# Patient Record
Sex: FEMALE | Race: BLACK OR AFRICAN AMERICAN | NOT HISPANIC OR LATINO | Employment: FULL TIME | URBAN - METROPOLITAN AREA
[De-identification: names, ages, dates, MRNs, and addresses within clinical notes are randomized per-mention and may not be internally consistent; named-entity substitution may affect disease eponyms.]

---

## 2020-11-16 ENCOUNTER — CLINICAL SUPPORT (OUTPATIENT)
Dept: URGENT CARE | Facility: CLINIC | Age: 65
End: 2020-11-16

## 2020-11-16 DIAGNOSIS — Z13.9 ENCOUNTER FOR SCREENING: Primary | ICD-10-CM

## 2020-11-16 LAB
CTP QC/QA: YES
SARS-COV-2 RDRP RESP QL NAA+PROBE: NEGATIVE

## 2020-11-16 PROCEDURE — U0002: ICD-10-PCS | Mod: QW,S$GLB,, | Performed by: PHYSICIAN ASSISTANT

## 2020-11-16 PROCEDURE — U0002 COVID-19 LAB TEST NON-CDC: HCPCS | Mod: QW,S$GLB,, | Performed by: PHYSICIAN ASSISTANT

## 2021-07-15 ENCOUNTER — OFFICE VISIT (OUTPATIENT)
Dept: URGENT CARE | Facility: CLINIC | Age: 66
End: 2021-07-15

## 2021-07-15 VITALS
BODY MASS INDEX: 35.36 KG/M2 | HEIGHT: 66 IN | HEART RATE: 81 BPM | DIASTOLIC BLOOD PRESSURE: 56 MMHG | RESPIRATION RATE: 18 BRPM | TEMPERATURE: 98 F | OXYGEN SATURATION: 99 % | WEIGHT: 220 LBS | SYSTOLIC BLOOD PRESSURE: 142 MMHG

## 2021-07-15 DIAGNOSIS — Z11.9 ENCOUNTER FOR SCREENING EXAMINATION FOR INFECTIOUS DISEASE: Primary | ICD-10-CM

## 2021-07-15 LAB
CTP QC/QA: YES
SARS-COV-2 RDRP RESP QL NAA+PROBE: NEGATIVE

## 2021-07-15 PROCEDURE — 3008F BODY MASS INDEX DOCD: CPT | Mod: CPTII,S$GLB,, | Performed by: PHYSICIAN ASSISTANT

## 2021-07-15 PROCEDURE — 99203 PR OFFICE/OUTPT VISIT, NEW, LEVL III, 30-44 MIN: ICD-10-PCS | Mod: S$GLB,,, | Performed by: PHYSICIAN ASSISTANT

## 2021-07-15 PROCEDURE — 3008F PR BODY MASS INDEX (BMI) DOCUMENTED: ICD-10-PCS | Mod: CPTII,S$GLB,, | Performed by: PHYSICIAN ASSISTANT

## 2021-07-15 PROCEDURE — U0002 COVID-19 LAB TEST NON-CDC: HCPCS | Mod: QW,S$GLB,, | Performed by: PHYSICIAN ASSISTANT

## 2021-07-15 PROCEDURE — U0002: ICD-10-PCS | Mod: QW,S$GLB,, | Performed by: PHYSICIAN ASSISTANT

## 2021-07-15 PROCEDURE — 99203 OFFICE O/P NEW LOW 30 MIN: CPT | Mod: S$GLB,,, | Performed by: PHYSICIAN ASSISTANT

## 2021-07-15 RX ORDER — LISINOPRIL AND HYDROCHLOROTHIAZIDE 20; 25 MG/1; MG/1
1 TABLET ORAL DAILY
COMMUNITY
Start: 2021-06-04

## 2021-07-15 RX ORDER — NAPROXEN SODIUM 220 MG/1
1 TABLET, FILM COATED ORAL DAILY
COMMUNITY
Start: 2021-06-04

## 2021-07-15 RX ORDER — LEVETIRACETAM 500 MG/1
1 TABLET ORAL 2 TIMES DAILY
COMMUNITY
Start: 2021-06-04

## 2021-07-15 RX ORDER — ATORVASTATIN CALCIUM 40 MG/1
1 TABLET, FILM COATED ORAL DAILY
COMMUNITY
Start: 2021-06-04

## 2022-09-02 ENCOUNTER — HOSPITAL ENCOUNTER (EMERGENCY)
Facility: HOSPITAL | Age: 67
Discharge: HOME | End: 2022-09-02
Attending: EMERGENCY MEDICINE
Payer: MEDICARE

## 2022-09-02 VITALS
HEART RATE: 52 BPM | DIASTOLIC BLOOD PRESSURE: 74 MMHG | SYSTOLIC BLOOD PRESSURE: 179 MMHG | OXYGEN SATURATION: 97 % | WEIGHT: 213 LBS | BODY MASS INDEX: 35.49 KG/M2 | TEMPERATURE: 98 F | HEIGHT: 65 IN | RESPIRATION RATE: 20 BRPM

## 2022-09-02 DIAGNOSIS — U07.1 COVID-19: Primary | ICD-10-CM

## 2022-09-02 DIAGNOSIS — U07.1 COVID-19 VIRUS DETECTED: ICD-10-CM

## 2022-09-02 LAB — SARS-COV-2 RDRP RESP QL NAA+PROBE: POSITIVE

## 2022-09-02 PROCEDURE — 99282 PR EMERGENCY DEPT VISIT,LEVEL II: ICD-10-PCS | Mod: CR,,, | Performed by: PHYSICIAN ASSISTANT

## 2022-09-02 PROCEDURE — U0002 COVID-19 LAB TEST NON-CDC: HCPCS | Performed by: EMERGENCY MEDICINE

## 2022-09-02 PROCEDURE — 99282 EMERGENCY DEPT VISIT SF MDM: CPT | Mod: CR,,, | Performed by: PHYSICIAN ASSISTANT

## 2022-09-02 PROCEDURE — 99283 EMERGENCY DEPT VISIT LOW MDM: CPT

## 2022-09-02 NOTE — FIRST PROVIDER EVALUATION
"Medical screening exam completed.  I have conducted a focused provider triage encounter, findings are as follows:    Brief history of present illness:  67 yo F w/ covid exposure c/o cough. No fever, sob, or chest pain    Vitals:    09/02/22 1621   BP: (!) 152/81   BP Location: Right arm   Patient Position: Sitting   Pulse: 70   Resp: 20   Temp: 98 °F (36.7 °C)   TempSrc: Oral   SpO2: 99%   Weight: 96.6 kg (213 lb)   Height: 5' 5" (1.651 m)       Pertinent physical exam:  no respiratory distress, well appearing    Brief workup plan:  rapid covid.    Preliminary workup initiated; this workup will be continued and followed by the physician or advanced practice provider that is assigned to the patient when roomed.  "

## 2022-09-03 NOTE — ED PROVIDER NOTES
Encounter Date: 9/2/2022       History     Chief Complaint   Patient presents with    COVID-19 Concerns     Wants a covid test, grandson tested +, reports cough, took 3 home tests, 2 were negative, 1 was positive     The patient is a 66 year old female, who presents to the ER requesting Covid testing. She has been vaccinated. She states that she tested positive at home on 8/24. She states that she had mild URI symptoms for 1-2 days, then seemed to improve. She states that she took another test on 8/29 due to being exposed to her grandson who was Covid positive and her result was negative. She states that she tested again today because she has a minor cough and wants to find out if she is at risk of spreading Covid. She states that she feels well overall and thinks that her test may be unreliable. She denies any additional symptoms or concerns. No pre-arrival treatment.     Review of patient's allergies indicates:  No Known Allergies  Past Medical History:   Diagnosis Date    Hyperlipidemia     Hypertension     Seizures      No past surgical history on file.  No family history on file.  Social History     Tobacco Use    Smoking status: Never    Smokeless tobacco: Never   Substance Use Topics    Alcohol use: No    Drug use: No     Review of Systems   Constitutional:  Negative for activity change, appetite change, chills, fatigue and fever.   HENT:  Negative for congestion, rhinorrhea and sore throat.    Eyes:  Negative for visual disturbance.   Respiratory:  Positive for cough. Negative for chest tightness, shortness of breath and wheezing.    Cardiovascular:  Negative for chest pain, palpitations and leg swelling.   Gastrointestinal:  Negative for abdominal pain, diarrhea, nausea and vomiting.   Genitourinary:  Negative for decreased urine volume, difficulty urinating, dysuria and flank pain.   Musculoskeletal:  Negative for back pain, gait problem, myalgias, neck pain and neck stiffness.   Skin:  Negative for rash.    Allergic/Immunologic: Negative for immunocompromised state.   Neurological:  Negative for dizziness, syncope, light-headedness, numbness and headaches.   Psychiatric/Behavioral:  Negative for confusion.      Physical Exam     Initial Vitals [09/02/22 1621]   BP Pulse Resp Temp SpO2   (!) 152/81 70 20 98 °F (36.7 °C) 99 %      MAP       --         Physical Exam    Nursing note and vitals reviewed.  Constitutional: She appears well-developed and well-nourished. She is not diaphoretic. No distress.   Alert and ambulatory. Well appearing. No obvious distress.    HENT:   Head: Normocephalic.   Mouth/Throat: Oropharynx is clear and moist.   Eyes: Conjunctivae are normal.   Cardiovascular:  Normal rate.           Pulmonary/Chest: No respiratory distress. She has no wheezes.   Not coughing during interview/exam. No conversational dyspnea.    Abdominal: She exhibits no distension. There is no abdominal tenderness.   Musculoskeletal:         General: No tenderness or edema. Normal range of motion.     Neurological: She is alert and oriented to person, place, and time. She has normal strength. No sensory deficit.   Skin: Skin is warm and dry. No rash noted.   Psychiatric: She has a normal mood and affect. Her behavior is normal.       ED Course   Procedures  Labs Reviewed   SARS-COV-2 RNA AMPLIFICATION, QUAL - Abnormal; Notable for the following components:       Result Value    SARS-CoV-2 RNA, Amplification, Qual Positive (*)     All other components within normal limits   HIV 1 / 2 ANTIBODY   HEPATITIS C ANTIBODY          Imaging Results    None          Medications - No data to display  Medical Decision Making:   Initial Assessment:   65 yo female, here requesting Covid test due to cough and recent exposure. Positive home test earlier.   Differential Diagnosis:   Covid, Bronchitis, URI, etc   Clinical Tests:   Lab Tests: Ordered and Reviewed  ED Management:  Vital signs reviewed - benign   Records reviewed   I discussed  the case with the ER attending physician   Covid is positive   Covid precautions provided   Pt advised to follow up closely with her PCP   Pt advised to return to the ER promptly if worse in any way                     Clinical Impression:   Final diagnoses:  [U07.1] COVID-19 (Primary)        ED Disposition Condition    Discharge Stable          ED Prescriptions    None       Follow-up Information       Follow up With Specialties Details Why Contact Info    [facility] Emergency Medicine  If symptoms worsen in any way. Rest, hydrate, and quarantine. Take Tylenol as directed. Follow up closely with primary care. [address]  [facility] [address]  [phone]             Markos Vargas PA-C  09/02/22 1910

## 2022-11-09 ENCOUNTER — HOSPITAL ENCOUNTER (EMERGENCY)
Facility: HOSPITAL | Age: 67
Discharge: HOME | End: 2022-11-09
Attending: EMERGENCY MEDICINE
Payer: MEDICARE

## 2022-11-09 VITALS
HEIGHT: 65 IN | TEMPERATURE: 98 F | RESPIRATION RATE: 18 BRPM | HEART RATE: 60 BPM | BODY MASS INDEX: 35.49 KG/M2 | SYSTOLIC BLOOD PRESSURE: 158 MMHG | DIASTOLIC BLOOD PRESSURE: 73 MMHG | WEIGHT: 213 LBS | OXYGEN SATURATION: 97 %

## 2022-11-09 DIAGNOSIS — J06.9 VIRAL URI WITH COUGH: Primary | ICD-10-CM

## 2022-11-09 LAB
INFLUENZA A, MOLECULAR: NOT DETECTED
INFLUENZA B, MOLECULAR: NOT DETECTED
RSV AG BY MOLECULAR METHOD: NOT DETECTED
SARS-COV-2 RNA RESP QL NAA+PROBE: NOT DETECTED

## 2022-11-09 PROCEDURE — 0241U SARS-COV2 (COVID) WITH FLU/RSV BY PCR: CPT | Performed by: NURSE PRACTITIONER

## 2022-11-09 PROCEDURE — 99284 EMERGENCY DEPT VISIT MOD MDM: CPT

## 2022-11-09 PROCEDURE — 99284 EMERGENCY DEPT VISIT MOD MDM: CPT | Mod: CS,,, | Performed by: NURSE PRACTITIONER

## 2022-11-09 PROCEDURE — 99284 PR EMERGENCY DEPT VISIT,LEVEL IV: ICD-10-PCS | Mod: CS,,, | Performed by: NURSE PRACTITIONER

## 2022-11-09 RX ORDER — FLUTICASONE PROPIONATE 50 MCG
1 SPRAY, SUSPENSION (ML) NASAL 2 TIMES DAILY PRN
Qty: 15 G | Refills: 0 | Status: SHIPPED | OUTPATIENT
Start: 2022-11-09

## 2022-11-09 RX ORDER — MINERAL OIL
180 ENEMA (ML) RECTAL NIGHTLY PRN
Qty: 10 TABLET | Refills: 0 | Status: SHIPPED | OUTPATIENT
Start: 2022-11-09 | End: 2022-11-19

## 2022-11-09 NOTE — ED NOTES
Patient identifiers verified and correct for Ms Tomas  C/C Sinus issues, SEE NN  APPEARANCE: awake and alert in NAD.  SKIN: warm, dry and intact. No breakdown or bruising.  MUSCULOSKELETAL: Patient moving all extremities spontaneously, no obvious swelling or deformities noted. Ambulates independently.  RESPIRATORY: Denies shortness of breath.Respirations unlabored. Positive cough, worse at night, denies fevers  CARDIAC: Denies CP, 2+ distal pulses; no peripheral edema  ABDOMEN: S/ND/NT, Denies nausea  : voids spontaneously, denies difficulty  Neurologic: AAO x 4; follows commands equal strength in all extremities; denies numbness/tingling. Denies dizziness  Denies weakness, reports frequently has to blo her nose,

## 2022-11-09 NOTE — ED PROVIDER NOTES
Chief complaint:  Sinusitis (Otc meds not working, having to sleep with mouth open, cough)      HPI:  Kimberly Tomas is a 66 y.o. female with past medical history significant for hypertension, hyperlipidemia and seizure disorder who presents to the emergency department today for evaluation of URI symptoms.  Her symptoms began on Tuesday.  She reports nasal symptoms including a runny nose during the day, a stopped up nose at night, a headache and a cough.  She states the headache is mild and located across her forehead.  It is gradual in onset and intermittent.  She denies any neck stiffness.  She has a cough that is occasionally productive of white sputum.  She denies hemoptysis.  She denies chest pain or shortness of breath.  She denies any fever or chills.  Her grandson is sick with similar symptoms.  She has not taken anything over-the-counter.  She states she came to the emergency department today because it is difficult to sleep at night because she can not breathe out of her nose.    Review of patient's allergies indicates:  No Known Allergies    No current facility-administered medications on file prior to encounter.     Current Outpatient Medications on File Prior to Encounter   Medication Sig Dispense Refill    atorvastatin (LIPITOR) 40 MG tablet Take 1 tablet by mouth once daily.      levETIRAcetam (KEPPRA) 500 MG Tab Take 1 tablet by mouth 2 (two) times daily.      lisinopriL-hydrochlorothiazide (PRINZIDE,ZESTORETIC) 20-25 mg Tab Take 1 tablet by mouth once daily.      aspirin 81 MG Chew Take 1 tablet by mouth once daily.      ibuprofen (ADVIL,MOTRIN) 600 MG tablet Take 1 tablet (600 mg total) by mouth every 6 (six) hours as needed for Pain. 20 tablet 0       PMH:  As per HPI and below:  Past Medical History:   Diagnosis Date    Hyperlipidemia     Hypertension     Seizures      History reviewed. No pertinent surgical history.    Social History     Socioeconomic History    Marital status: Single   Tobacco  Use    Smoking status: Never    Smokeless tobacco: Never   Substance and Sexual Activity    Alcohol use: No    Drug use: No       History reviewed. No pertinent family history.    Review of Systems  As per HPI and Below  Constitutional: Negative for chills and fever.   HENT:  Positive for nasal congestion.  Negative for sore throat.  Negative for ear pain.  Negative for facial pain.  Eyes: Negative for visual disturbance.  Negative for eye pain.  Negative for eye redness.  Respiratory: Negative shortness of breath.  Positive for cough.  Positive for sputum.  Negative for hemoptysis.  Cardiovascular: Negative for chest pain.   Gastrointestinal: Negative for abdominal pain, diarrhea, nausea and vomiting.   Genitourinary: Negative for flank pain. Negative for dysuria.  Musculoskeletal: Negative for arthralgias and myalgias.  Negative for neck pain.  Negative for neck stiffness.  Skin: Negative for rash and wound.   Neurological: Negative for dizziness. Negative for weakness and numbness.  Positive for headache.  Psychiatric/Behavioral: Negative for confusion.       Physical Exam:    Vitals:    11/09/22 1825   BP: (!) 158/73   Pulse: 60   Resp: 18   Temp: 98.2 °F (36.8 °C)     Nursing note and vitals reviewed.  Constitutional: Patient appears well-developed and well-nourished. Not diaphoretic. No distress.   HENT:   Head: Normocephalic and atraumatic.  Normal-appearing posterior oropharynx, no erythema, exudates or petechiae.  Uvula is midline.  Normal voice.  No trismus.  Normal TMs bilaterally.  No sinus tenderness.  Boggy turbinates bilaterally.  Eyes: Conjunctivae are normal. No scleral icterus.   Neck: Normal range of motion. Neck supple.   Cardiovascular: Normal rate, regular rhythm and normal heart sounds.   Pulmonary/Chest:  Lungs are clear to auscultation bilaterally.  No respiratory distress.  Abdominal: Soft. There is no tenderness.   Musculoskeletal: Normal range of motion. No edema or tenderness.    Neurological: Alert and oriented to person, place, and time. Normal strength. No sensory deficit.   Skin: Skin is warm and dry. No rash noted. No erythema. No pallor.   Psychiatric: Normal mood and affect. Thought content normal.       Labs Reviewed   SARS-COV2 (COVID) WITH FLU/RSV BY PCR       Imaging Results    None         No results found.    Procedures      MDM:    66 y.o. female presenting for evaluation of URI symptoms that began 3 days ago.  She denies fever or chills at home and is afebrile, nontoxic and nondistressed appearing in the emergency department today.  She came in because her nose is stopped up at night making it difficult to sleep.    She is well appearing.  Will obtain swab for COVID/flu/RSV.  Her lungs are clear and she denies any chest pain or shortness of breath.  She is afebrile.  I do not think that a chest x-ray is warranted.    COVID/flu/RSV swab is negative.  I suspect that she has a viral URI.  Plan to discharge home with supportive care.    She was discharged home with prescriptions for Flonase and fexofenadine.  She is to follow up outpatient.  ED return precautions discussed.            Medication List        START taking these medications      fexofenadine 180 MG tablet  Commonly known as: ALLEGRA  Take 1 tablet (180 mg total) by mouth nightly as needed (cold symptoms).     fluticasone propionate 50 mcg/actuation nasal spray  Commonly known as: FLONASE  1 spray (50 mcg total) by Each Nostril route 2 (two) times daily as needed for Rhinitis.            ASK your doctor about these medications      aspirin 81 MG Chew     atorvastatin 40 MG tablet  Commonly known as: LIPITOR     ibuprofen 600 MG tablet  Commonly known as: ADVIL,MOTRIN  Take 1 tablet (600 mg total) by mouth every 6 (six) hours as needed for Pain.     levETIRAcetam 500 MG Tab  Commonly known as: KEPPRA     lisinopriL-hydrochlorothiazide 20-25 mg Tab  Commonly known as: PRINZIDE,ZESTORETIC               Where to Get  Your Medications        You can get these medications from any pharmacy    Bring a paper prescription for each of these medications  fexofenadine 180 MG tablet  fluticasone propionate 50 mcg/actuation nasal spray          Diagnoses:  The encounter diagnosis was Viral URI with cough.         Brenda Arias NP  11/10/22 3144

## 2022-11-09 NOTE — ED NOTES
Patient states sinus issues and difficulty breathing thru nose x 3 days, reports cough at night and headache

## 2023-01-23 ENCOUNTER — HOSPITAL ENCOUNTER (EMERGENCY)
Facility: HOSPITAL | Age: 68
Discharge: HOME | End: 2023-01-23
Attending: EMERGENCY MEDICINE
Payer: MEDICARE

## 2023-01-23 VITALS
WEIGHT: 220 LBS | SYSTOLIC BLOOD PRESSURE: 156 MMHG | RESPIRATION RATE: 18 BRPM | DIASTOLIC BLOOD PRESSURE: 70 MMHG | HEART RATE: 52 BPM | HEIGHT: 65 IN | TEMPERATURE: 97 F | OXYGEN SATURATION: 100 % | BODY MASS INDEX: 36.65 KG/M2

## 2023-01-23 DIAGNOSIS — R07.9 CHEST PAIN: ICD-10-CM

## 2023-01-23 DIAGNOSIS — R07.89 CHEST DISCOMFORT: ICD-10-CM

## 2023-01-23 LAB
ALBUMIN SERPL BCP-MCNC: 3.8 G/DL (ref 3.5–5.2)
ALP SERPL-CCNC: 96 U/L (ref 55–135)
ALT SERPL W/O P-5'-P-CCNC: 8 U/L (ref 10–44)
ANION GAP SERPL CALC-SCNC: 10 MMOL/L (ref 8–16)
AST SERPL-CCNC: 18 U/L (ref 10–40)
BASOPHILS # BLD AUTO: 0.02 K/UL (ref 0–0.2)
BASOPHILS NFR BLD: 0.5 % (ref 0–1.9)
BILIRUB SERPL-MCNC: 0.6 MG/DL (ref 0.1–1)
BNP SERPL-MCNC: 23 PG/ML (ref 0–99)
BUN SERPL-MCNC: 5 MG/DL (ref 8–23)
CALCIUM SERPL-MCNC: 9.9 MG/DL (ref 8.7–10.5)
CHLORIDE SERPL-SCNC: 108 MMOL/L (ref 95–110)
CO2 SERPL-SCNC: 24 MMOL/L (ref 23–29)
CREAT SERPL-MCNC: 0.8 MG/DL (ref 0.5–1.4)
DIFFERENTIAL METHOD: ABNORMAL
EOSINOPHIL # BLD AUTO: 0.1 K/UL (ref 0–0.5)
EOSINOPHIL NFR BLD: 2.1 % (ref 0–8)
ERYTHROCYTE [DISTWIDTH] IN BLOOD BY AUTOMATED COUNT: 13.9 % (ref 11.5–14.5)
EST. GFR  (NO RACE VARIABLE): >60 ML/MIN/1.73 M^2
GLUCOSE SERPL-MCNC: 78 MG/DL (ref 70–110)
HCT VFR BLD AUTO: 40.9 % (ref 37–48.5)
HGB BLD-MCNC: 12.8 G/DL (ref 12–16)
IMM GRANULOCYTES # BLD AUTO: 0 K/UL (ref 0–0.04)
IMM GRANULOCYTES NFR BLD AUTO: 0 % (ref 0–0.5)
LYMPHOCYTES # BLD AUTO: 1.6 K/UL (ref 1–4.8)
LYMPHOCYTES NFR BLD: 42.2 % (ref 18–48)
MCH RBC QN AUTO: 28.5 PG (ref 27–31)
MCHC RBC AUTO-ENTMCNC: 31.3 G/DL (ref 32–36)
MCV RBC AUTO: 91 FL (ref 82–98)
MONOCYTES # BLD AUTO: 0.4 K/UL (ref 0.3–1)
MONOCYTES NFR BLD: 10.8 % (ref 4–15)
NEUTROPHILS # BLD AUTO: 1.7 K/UL (ref 1.8–7.7)
NEUTROPHILS NFR BLD: 44.4 % (ref 38–73)
NRBC BLD-RTO: 0 /100 WBC
PLATELET # BLD AUTO: 244 K/UL (ref 150–450)
PMV BLD AUTO: 11 FL (ref 9.2–12.9)
POC CARDIAC TROPONIN I: 0.01 NG/ML (ref 0–0.08)
POTASSIUM SERPL-SCNC: 4 MMOL/L (ref 3.5–5.1)
PROT SERPL-MCNC: 7.5 G/DL (ref 6–8.4)
RBC # BLD AUTO: 4.49 M/UL (ref 4–5.4)
SAMPLE: NORMAL
SODIUM SERPL-SCNC: 142 MMOL/L (ref 136–145)
TROPONIN I SERPL DL<=0.01 NG/ML-MCNC: <0.006 NG/ML (ref 0–0.03)
TROPONIN I SERPL DL<=0.01 NG/ML-MCNC: <0.006 NG/ML (ref 0–0.03)
WBC # BLD AUTO: 3.89 K/UL (ref 3.9–12.7)

## 2023-01-23 PROCEDURE — 80053 COMPREHEN METABOLIC PANEL: CPT | Performed by: EMERGENCY MEDICINE

## 2023-01-23 PROCEDURE — 99285 EMERGENCY DEPT VISIT HI MDM: CPT | Mod: 25

## 2023-01-23 PROCEDURE — 93010 EKG 12-LEAD: ICD-10-PCS | Mod: ,,, | Performed by: INTERNAL MEDICINE

## 2023-01-23 PROCEDURE — 93010 ELECTROCARDIOGRAM REPORT: CPT | Mod: ,,, | Performed by: INTERNAL MEDICINE

## 2023-01-23 PROCEDURE — 99284 PR EMERGENCY DEPT VISIT,LEVEL IV: ICD-10-PCS | Mod: ,,, | Performed by: EMERGENCY MEDICINE

## 2023-01-23 PROCEDURE — 85025 COMPLETE CBC W/AUTO DIFF WBC: CPT | Performed by: EMERGENCY MEDICINE

## 2023-01-23 PROCEDURE — 99284 EMERGENCY DEPT VISIT MOD MDM: CPT | Mod: ,,, | Performed by: EMERGENCY MEDICINE

## 2023-01-23 PROCEDURE — 93005 ELECTROCARDIOGRAM TRACING: CPT

## 2023-01-23 PROCEDURE — 83880 ASSAY OF NATRIURETIC PEPTIDE: CPT | Performed by: EMERGENCY MEDICINE

## 2023-01-23 PROCEDURE — 84484 ASSAY OF TROPONIN QUANT: CPT | Performed by: EMERGENCY MEDICINE

## 2023-01-23 PROCEDURE — 84484 ASSAY OF TROPONIN QUANT: CPT

## 2023-01-23 PROCEDURE — 25000003 PHARM REV CODE 250: Performed by: EMERGENCY MEDICINE

## 2023-01-23 RX ORDER — ASPIRIN 325 MG
325 TABLET ORAL
Status: COMPLETED | OUTPATIENT
Start: 2023-01-23 | End: 2023-01-23

## 2023-01-23 RX ADMIN — ASPIRIN 325 MG: 325 TABLET ORAL at 06:01

## 2023-01-24 NOTE — DISCHARGE INSTRUCTIONS
Today, your evaluation for your chest symptoms showed a normal ECG, normal cardiac enzymes and a chest x-ray without any abnormalities.  You do have risk factors for heart disease which nclude high blood pressure.  We recommend close follow-up with your primary care in the next 3-5 days for repeat evaluation and further cardiac risk evaluation.  If he develops any worsening symptoms including chest pain, shortness of breath or any concerns follow-up sooner or return emergency department.  Continue taking your baby aspirin daily.  You may also take ibuprofen if you develop any muscle pains.    Our goal in the emergency department is to always give you outstanding care and exceptional service. You may receive a survey by mail or e-mail in the next week regarding your experience in our ED. We would greatly appreciate your completing and returning the survey. Your feedback provides us with a way to recognize our staff who give very good care and it helps us learn how to improve when your experience was below our aspiration of excellence.

## 2023-01-24 NOTE — ED NOTES
Pt. Dc'd home all dc information reviewed withb Pt. By Provider Pt. Verbalized understanding.Pt. ambulated to exit

## 2023-01-24 NOTE — ED NOTES
Patient identifiers verified and correct for  MS Tomas  C/C:  Chest pain SEE NN  APPEARANCE: awake and alert in NAD. PAIN  6/10  SKIN: warm, dry and intact. No breakdown or bruising.  MUSCULOSKELETAL: Patient moving all extremities spontaneously, no obvious swelling or deformities noted. Ambulates independently.  RESPIRATORY: Positive  shortness of breath.Respirations unlabored.   CARDIAC: Intermittent left  CP, 2+ distal pulses; no peripheral edema  ABDOMEN: S/ND/NT, Denies nausea  : voids spontaneously, denies difficulty  Neurologic: AAO x 4; follows commands equal strength in all extremities; denies numbness/tingling. Denies dizziness  denies new wekaness

## 2023-01-24 NOTE — ED PROVIDER NOTES
Encounter Date: 1/23/2023    SCRIBE #1 NOTE: I, Tanesha Bush, am scribing for, and in the presence of,  Van Benavides DO. I have scribed the following portions of the note - Other sections scribed: HPI.     History     Chief Complaint   Patient presents with    Chest Pain     Denies cardiac hx   Time patient was seen by the provider: 6:48 PM      Kimberly Tomas is a 67 y.o. female with a past medical history of hyperlipidemia and hypertension who presents to the ED with a complaint of left sided chest pain. The patient states that she has been experiencing chest pain, which has been waxing and waning for several days. The patient describes her pain as heaviness. She denies any active shortness of breath. No other exacerbating or alleviating factors. She denies radiation of pain to other locations or down her left arm. Patient denies lightheadedness, dizziness, coughing, fever, chills, nausea, emesis, neck pain, chest pain when taking deep breaths, jaw pain, or other associated symptoms.    The history is provided by the patient and medical records. No  was used.   Review of patient's allergies indicates:  No Known Allergies  Past Medical History:   Diagnosis Date    Hyperlipidemia     Hypertension     Seizures      History reviewed. No pertinent surgical history.  History reviewed. No pertinent family history.  Social History     Tobacco Use    Smoking status: Never    Smokeless tobacco: Never   Substance Use Topics    Alcohol use: No    Drug use: No     Review of Systems  All other systems reviewed and were negative; see HPI also for additional ROS.    Physical Exam     Initial Vitals [01/23/23 1708]   BP Pulse Resp Temp SpO2   (!) 156/70 (!) 52 18 97.4 °F (36.3 °C) 100 %      MAP       --         Physical Exam    Nursing note and vitals reviewed.      Gen/Constitutional: Interactive. No acute distress  Head: Normocephalic, Atraumatic  Neck: supple, no masses or LAD  Eyes: PERRLA,  conjunctiva clear  Ears, Nose and Throat: No rhinorrhea or stridor.  Cardiac: Reg Rhythm, No murmur  Pulmonary: CTA Bilat, no wheezes, rhonchi, rales.  GI: Abdomen soft, non-tender, non-distended; no rebound or guarding  : No CVA tenderness.  Musculoskeletal: Extremities warm, well perfused, no erythema, no edema  Skin: No rashes  Neuro: Alert and Oriented x 3; No focal motor or sensory deficits.    Psych: Normal affect    ED Course   Procedures  Labs Reviewed   CBC W/ AUTO DIFFERENTIAL - Abnormal; Notable for the following components:       Result Value    WBC 3.89 (*)     MCHC 31.3 (*)     Gran # (ANC) 1.7 (*)     All other components within normal limits   COMPREHENSIVE METABOLIC PANEL - Abnormal; Notable for the following components:    BUN 5 (*)     ALT 8 (*)     All other components within normal limits   TROPONIN I   TROPONIN I   B-TYPE NATRIURETIC PEPTIDE   TROPONIN ISTAT   HIV 1 / 2 ANTIBODY   HEPATITIS C ANTIBODY   POCT TROPONIN   POCT TROPONIN     EKG Readings: (Independently Interpreted)   Initial Reading: No STEMI. Previous EKG: Compared with most recent EKG Rhythm: Normal Sinus Rhythm. Heart Rate: 61. ST Segments: Normal ST Segments. T Waves: Normal. Axis: Normal.   NSR at a rate of 61. No STEMI     Imaging Results              X-Ray Chest AP Portable (Final result)  Result time 01/23/23 19:10:39      Final result by Sabas Smith MD (01/23/23 19:10:39)                   Impression:      Stable and negative chest.      Electronically signed by: Sabas Smith  Date:    01/23/2023  Time:    19:10               Narrative:    EXAMINATION:  XR CHEST AP PORTABLE    CLINICAL HISTORY:  Chest Pain;    TECHNIQUE:  Single frontal view of the chest was performed.    COMPARISON:  The 12/21/2015    FINDINGS:  The heart is not enlarged the trachea is midline.  The lungs and pleural spaces appear clear.                                    X-Rays:   Independently Interpreted Readings:   Chest X-Ray: Normal  heart size.  No infiltrates.  No acute abnormalities. no pneumothorax or free air.   Medications   aspirin tablet 325 mg (325 mg Oral Given 1/23/23 1816)     Medical Decision Making:   History:   Old Medical Records: I decided to obtain old medical records.  Initial Assessment:   Kimberly Tomas is a 67 y.o. female with a past medical history of hyperlipidemia and hypertension who presents to the ED with a complaint of left sided chest pain.  Differential Diagnosis:   ACS, PE, pneumonia, pneumothorax  Independently Interpreted Test(s):   I have ordered and independently interpreted X-rays - see prior notes.  I have ordered and independently interpreted EKG Reading(s) - see prior notes  Clinical Tests:   Lab Tests: Ordered and Reviewed  Radiological Study: Ordered and Reviewed  Medical Tests: Ordered and Reviewed  ED Management:  Labs  Aspirin    Emergent evaluation of patient presenting with chest heaviness and left-sided chest pain that is intermittent but not associated with exertion.  She is currently afebrile vital signs are stable.  No previous cardiac history. Given patient's presentation, acute coronary syndrome is on the differential, therefore a broad cardiac workup was obtained in the emergency department. Patient's HEART score is 2. Cardiac risk factors include hyperlipidemia hypertension.  Patient was given 325mg of aspirin in the emergency department.  EKG with normal sinus rhythm, no significant ST segment or T-wave abnormalities to suggest acute ischemia. No STEMI. Chest x-ray with no acute cardiopulmonary pathology per my read. No significant changes from previous EKG.  Doubt acute thoracic aortic dissection given patient is without typical symptoms of tearing back pain, has symmetrical radial pulses, and is without noted radiographic findings to suggest acute aortic dissection.  Low suspicion for pulmonary embolism given clinical history and findings.  She also has a Wells score of 0.  Low suspicion  for hollow viscus organ perforation or pneumothorax given physical exam, vital signs, and negative x-rays.  On reevaluation, patient's chest pain symptoms have resolved. Patient currently without any chest discomfort. Both initial and 2 hour delta troponin are negative. Negative serial cardiac enzymes and unremarkable EKG are reassuring that patient's symptoms are not related to acute myocardial infarction.  Laboratory studies to include CBC and metabolic panel also without significant abnormalities.  The exact etiology of patient's chest pain is not entirely clear, however I doubt emergent pathology given patient's exam, vitals, and workup today. Given EKG is unremarkable, cardiac enzymes are negative, and that chest pain has resolved in the ED I feel the patient would be a good candidate for further outpatient workup of chest pain with a stress test or other provocative testing. Patient does have access to primary care who can help facilitate further workup. I think that it is reasonable to pursue further outpatient workup and that admission is not necessitated at this time.    I have strongly encouraged patient to followup with his primary care physician for reevaluation and to set up outpatient stress test.. I did have a lengthy discussion regarding chest pain sign/symptoms that would require immediate return to the ED. I did discuss the importance stress testing to further risk stratify patient's symptoms. I also discussed the importance of primary care followup for cardiac medicinal optimization and lifestyle modifications which can be further discussed and monitored    Additional MDM:   Heart Score:    History:          Slightly suspicious.  ECG:             Normal  Age:               45-65 years  Risk factors: 1-2 risk factors  Troponin:       Less than or equal to normal limit  Final Score: 2       Scribe Attestation:   Scribe #1: I performed the above scribed service and the documentation accurately  describes the services I performed. I attest to the accuracy of the note.            I, Dr. Van Benavides, personally performed the services described in this documentation. All medical record entries made by the scribe were at my direction and in my presence.  I have reviewed the chart and agree that the record reflects my personal performance and is accurate and complete.          Clinical Impression:   Final diagnoses:  [R07.89] Chest discomfort  [R07.9] Chest pain        ED Disposition Condition    Discharge Stable          ED Prescriptions    None       Follow-up Information       Follow up With Specialties Details Why Contact Info    [facility]  Schedule an appointment as soon as possible for a visit in 1 week  [address] [facility] [address]  [phone]            Van Benavides DO, FAAEM  Emergency Staff Physician   Dept of Emergency Medicine   [facility]  Spectralink: [id number]        Disclaimer: This note has been generated using voice-recognition software. There may be typographical errors that have been missed during proof-reading.       Van Benavides DO  01/24/23 0043

## 2023-05-09 ENCOUNTER — HOSPITAL ENCOUNTER (EMERGENCY)
Facility: OTHER | Age: 68
Discharge: HOME | End: 2023-05-10
Attending: EMERGENCY MEDICINE
Payer: COMMERCIAL

## 2023-05-09 DIAGNOSIS — R30.0 DYSURIA: Primary | ICD-10-CM

## 2023-05-09 DIAGNOSIS — R10.2 VAGINAL PAIN: ICD-10-CM

## 2023-05-09 LAB
BACTERIA GENITAL QL WET PREP: ABNORMAL
CLUE CELLS VAG QL WET PREP: ABNORMAL
FILAMENT FUNGI VAG WET PREP-#/AREA: ABNORMAL
SPECIMEN SOURCE: ABNORMAL
T VAGINALIS GENITAL QL WET PREP: ABNORMAL
WBC #/AREA VAG WET PREP: ABNORMAL
YEAST GENITAL QL WET PREP: ABNORMAL

## 2023-05-09 PROCEDURE — 25000003 PHARM REV CODE 250: Performed by: EMERGENCY MEDICINE

## 2023-05-09 PROCEDURE — 87529 HSV DNA AMP PROBE: CPT | Performed by: EMERGENCY MEDICINE

## 2023-05-09 PROCEDURE — 87591 N.GONORRHOEAE DNA AMP PROB: CPT | Performed by: EMERGENCY MEDICINE

## 2023-05-09 PROCEDURE — 99283 EMERGENCY DEPT VISIT LOW MDM: CPT

## 2023-05-09 PROCEDURE — 87210 SMEAR WET MOUNT SALINE/INK: CPT | Performed by: EMERGENCY MEDICINE

## 2023-05-09 RX ORDER — LIDOCAINE HYDROCHLORIDE 20 MG/ML
JELLY TOPICAL
Status: COMPLETED | OUTPATIENT
Start: 2023-05-09 | End: 2023-05-09

## 2023-05-09 RX ADMIN — LIDOCAINE HYDROCHLORIDE 10 ML: 20 JELLY TOPICAL at 11:05

## 2023-05-10 VITALS
HEART RATE: 68 BPM | TEMPERATURE: 97 F | DIASTOLIC BLOOD PRESSURE: 78 MMHG | WEIGHT: 213 LBS | BODY MASS INDEX: 35.45 KG/M2 | RESPIRATION RATE: 18 BRPM | SYSTOLIC BLOOD PRESSURE: 145 MMHG | OXYGEN SATURATION: 99 %

## 2023-05-10 NOTE — ED PROVIDER NOTES
"Encounter Date: 5/9/2023    SCRIBE #1 NOTE: I, John Felix, am scribing for, and in the presence of,  Senia Rodriguez MD.     History     Chief Complaint   Patient presents with    Dysuria     C/O OF DISCOMFORT & IRRITATION ESPECIALLY WITH ANY ATTEMPT TO VOID      Time seen by provider: 9:22 PM    This is a 67 y.o. female who presents with complaint of dysuria and vaginal soreness on the left side for the past 3-4 weeks. Patient reports urinary frequency and hesitancy but denies any hematuria, nausea, vomiting, back pain, fever, chills, or abdominal pain.  She also notes a funky smell and small amounts of white discharge that "comes and goes". She notes she has not been sexually active for eight months and denies any history of STDs or frequent UTIs. This is the extent of the patient's complaints at this time.       The history is provided by the patient.   Review of patient's allergies indicates:  No Known Allergies  Past Medical History:   Diagnosis Date    Hyperlipidemia     Hypertension     Seizures      No past surgical history on file.  No family history on file.  Social History     Tobacco Use    Smoking status: Never    Smokeless tobacco: Never   Substance Use Topics    Alcohol use: No    Drug use: No     Review of Systems   Constitutional:  Negative for chills and fever.   HENT:  Negative for congestion and ear pain.    Eyes:  Negative for pain and visual disturbance.   Respiratory:  Negative for cough and shortness of breath.    Cardiovascular:  Negative for chest pain, palpitations and leg swelling.   Gastrointestinal:  Negative for abdominal pain, diarrhea, nausea and vomiting.   Genitourinary:  Positive for dysuria, vaginal discharge and vaginal pain. Negative for hematuria.   Musculoskeletal:  Negative for arthralgias, back pain and myalgias.   Skin:  Negative for color change, pallor, rash and wound.   Allergic/Immunologic: Negative for environmental allergies, food allergies and immunocompromised " state.   Neurological:  Negative for dizziness, weakness, light-headedness and headaches.   Hematological:  Negative for adenopathy. Does not bruise/bleed easily.   Psychiatric/Behavioral:  Negative for agitation, behavioral problems and confusion.    All other systems reviewed and are negative.    Physical Exam     Initial Vitals [05/09/23 2102]   BP Pulse Resp Temp SpO2   (!) 195/81 64 17 98.2 °F (36.8 °C) 99 %      MAP       --         Physical Exam    Nursing note and vitals reviewed.  Constitutional: She appears well-developed and well-nourished. She is not diaphoretic. She does not have a sickly appearance. No distress.   HENT:   Head: Normocephalic and atraumatic.   Right Ear: External ear normal.   Left Ear: External ear normal.   Eyes: Conjunctivae, EOM and lids are normal. Right eye exhibits no discharge. Left eye exhibits no discharge. Right conjunctiva is not injected. Right conjunctiva has no hemorrhage. Left conjunctiva is not injected. Left conjunctiva has no hemorrhage. No scleral icterus.   Neck: Phonation normal. No stridor present. No tracheal deviation present.   Normal range of motion.  Cardiovascular:  Normal rate, regular rhythm and normal heart sounds.     Exam reveals no friction rub.       No murmur heard.  Pulses:       Radial pulses are 2+ on the right side and 2+ on the left side.        Dorsalis pedis pulses are 2+ on the right side and 2+ on the left side.   Pulmonary/Chest: Breath sounds normal. She has no wheezes. She has no rhonchi. She has no rales.   Abdominal:   No CVA tenderness.   Genitourinary:    Genitourinary Comments: Proximally 5 mm area of erythema along the left labia majora that is tender to palpation.  No visible vesicles/lesions.  No areas of induration or fluctuance.  Trace amount of whitish colored discharge.  No CMT or adnexal tenderness.     Musculoskeletal:      Cervical back: Normal range of motion.     Neurological: She is alert and oriented to person, place,  and time. She has normal strength. GCS eye subscore is 4. GCS verbal subscore is 5. GCS motor subscore is 6.   Skin: Skin is warm.   Psychiatric: She has a normal mood and affect. Her speech is normal and behavior is normal. Judgment and thought content normal. Cognition and memory are normal.       ED Course   Procedures  Labs Reviewed   VAGINAL SCREEN - Abnormal; Notable for the following components:       Result Value    WBC - Vaginal Screen Few (*)     Bacteria - Vaginal Screen Many (*)     All other components within normal limits    Narrative:     Release to patient->Immediate   C. TRACHOMATIS/N. GONORRHOEAE BY AMP DNA   URINALYSIS, REFLEX TO URINE CULTURE   HERPES SIMPLEX (HSV) BY RAPID PCR, NON-BLOOD          Imaging Results    None          Medications   LIDOcaine HCl 2% urojet (10 mLs Mucous Membrane Given 5/9/23 9240)     Medical Decision Making:   History:   Old Medical Records: I decided to obtain old medical records.  Clinical Tests:   Lab Tests: Ordered and Reviewed  Additional MDM:   Comments: 67-year-old female presents for evaluation of dysuria.  She also reports other urinary symptoms and vaginal discharge which she states have been present for 3-4 weeks.  On exam she has a very small area of erythema on the left labia which is the location of her pain.  From the way she describes her symptoms, this appears to be the cause of her dysuria.  I did order urinalysis, however, the patient has been unable to provide a sample.  She states she does not have a need to urinate.  Bladder scan only 120 mL.  Her daughter is ready to pick her up and she would like to return to provide her urine sample.  Wet prep positive for bacteria and a few white blood cells but otherwise negative.  GC swab was collected although low suspicion given her exam.  The area of erythema was also swabbed for HSV although not consistent with typical herpes lesion.  Will prescribe topical lidocaine for comfort.  The patient was  instructed to follow up with her primary care physician for re-evaluation and UA.  .      Scribe Attestation:   Scribe #1: I performed the above scribed service and the documentation accurately describes the services I performed. I attest to the accuracy of the note.  Physician Attestation for Scribe: I, Senia Rodriguez   , reviewed documentation as scribed in my presence, which is both accurate and complete.                    Clinical Impression:   Final diagnoses:  [R30.0] Dysuria (Primary)  [R10.2] Vaginal pain        ED Disposition Condition    Discharge Stable          ED Prescriptions       Medication Sig Dispense Start Date End Date Auth. Provider    LIDOcaine 5 % Gel Apply 1 application topically 2 (two) times daily as needed (Pain). 30 g 5/10/2023 5/17/2023 Senia Rodriguez MD          Follow-up Information       Follow up With Specialties Details Why Contact Info    Primary care doctor  Schedule an appointment as soon as possible for a visit                Senia Rodriguez MD  05/10/23 0010

## 2023-05-10 NOTE — ED TRIAGE NOTES
Pt arrived to ED from home c/o discomfort and irritation when trying to urinate for a few months.

## 2023-05-10 NOTE — ED NOTES
Ms. Tomas was notified by phone that she has a prescription and her glasses were left here in the ER. She said she would come and pick them up in the AM.

## 2023-05-12 LAB
C TRACH DNA SPEC QL NAA+PROBE: NOT DETECTED
HSV1 DNA SPEC QL NAA+PROBE: NEGATIVE
HSV2 DNA SPEC QL NAA+PROBE: POSITIVE
N GONORRHOEA DNA SPEC QL NAA+PROBE: NOT DETECTED
SPECIMEN SOURCE: ABNORMAL

## 2023-07-25 ENCOUNTER — OFFICE VISIT (OUTPATIENT)
Dept: INTERNAL MEDICINE | Facility: CLINIC | Age: 68
End: 2023-07-25
Payer: COMMERCIAL

## 2023-07-25 DIAGNOSIS — R45.89 DEPRESSED MOOD: ICD-10-CM

## 2023-07-25 DIAGNOSIS — I10 HYPERTENSION, UNSPECIFIED TYPE: ICD-10-CM

## 2023-07-25 DIAGNOSIS — N89.8 VAGINAL ODOR: ICD-10-CM

## 2023-07-25 DIAGNOSIS — R45.89 ANXIETY ABOUT HEALTH: ICD-10-CM

## 2023-07-25 DIAGNOSIS — R68.89 FORGETFULNESS: ICD-10-CM

## 2023-07-25 DIAGNOSIS — G40.909 SEIZURE DISORDER: Primary | ICD-10-CM

## 2023-07-25 PROCEDURE — 4010F PR ACE/ARB THEARPY RXD/TAKEN: ICD-10-PCS | Mod: CPTII,S$GLB,, | Performed by: PHYSICIAN ASSISTANT

## 2023-07-25 PROCEDURE — 1160F PR REVIEW ALL MEDS BY PRESCRIBER/CLIN PHARMACIST DOCUMENTED: ICD-10-PCS | Mod: CPTII,S$GLB,, | Performed by: PHYSICIAN ASSISTANT

## 2023-07-25 PROCEDURE — 99204 PR OFFICE/OUTPT VISIT, NEW, LEVL IV, 45-59 MIN: ICD-10-PCS | Mod: S$GLB,,, | Performed by: PHYSICIAN ASSISTANT

## 2023-07-25 PROCEDURE — 1159F PR MEDICATION LIST DOCUMENTED IN MEDICAL RECORD: ICD-10-PCS | Mod: CPTII,S$GLB,, | Performed by: PHYSICIAN ASSISTANT

## 2023-07-25 PROCEDURE — 1159F MED LIST DOCD IN RCRD: CPT | Mod: CPTII,S$GLB,, | Performed by: PHYSICIAN ASSISTANT

## 2023-07-25 PROCEDURE — 99999 PR PBB SHADOW E&M-EST. PATIENT-LVL IV: CPT | Mod: PBBFAC,,, | Performed by: PHYSICIAN ASSISTANT

## 2023-07-25 PROCEDURE — 1160F RVW MEDS BY RX/DR IN RCRD: CPT | Mod: CPTII,S$GLB,, | Performed by: PHYSICIAN ASSISTANT

## 2023-07-25 PROCEDURE — 99999 PR PBB SHADOW E&M-EST. PATIENT-LVL IV: ICD-10-PCS | Mod: PBBFAC,,, | Performed by: PHYSICIAN ASSISTANT

## 2023-07-25 PROCEDURE — 4010F ACE/ARB THERAPY RXD/TAKEN: CPT | Mod: CPTII,S$GLB,, | Performed by: PHYSICIAN ASSISTANT

## 2023-07-25 PROCEDURE — 99204 OFFICE O/P NEW MOD 45 MIN: CPT | Mod: S$GLB,,, | Performed by: PHYSICIAN ASSISTANT

## 2023-07-25 NOTE — PATIENT INSTRUCTIONS
I have referred you to our Neurology department for second opinion regarding seizure history    I have referred you to GYN for your vaginal concerns    Recommend to schedule with our therapist at [phone]    Keep appt with Dr. Mendez in September to be your new PCP    Continue all your current meds.

## 2023-08-06 PROBLEM — H35.341 LAMELLAR MACULAR HOLE OF RIGHT EYE: Status: ACTIVE | Noted: 2020-09-22

## 2023-08-06 PROBLEM — Z86.73 HISTORY OF STROKE: Status: ACTIVE | Noted: 2019-04-05

## 2023-08-06 PROBLEM — R73.03 PREDIABETES: Status: ACTIVE | Noted: 2022-03-31

## 2023-08-06 PROBLEM — H81.399 PERIPHERAL VERTIGO: Status: ACTIVE | Noted: 2022-11-21

## 2023-08-06 PROBLEM — H35.363 PERIPHERAL DRUSEN OF BOTH EYES: Status: ACTIVE | Noted: 2020-09-22

## 2023-08-06 PROBLEM — R56.9 SEIZURE: Status: ACTIVE | Noted: 2022-11-21

## 2023-08-06 PROBLEM — E78.5 DYSLIPIDEMIA: Status: ACTIVE | Noted: 2019-04-05

## 2023-08-06 PROBLEM — Z86.0100 HISTORY OF COLON POLYPS: Status: ACTIVE | Noted: 2020-11-12

## 2023-08-06 PROBLEM — Z86.010 HISTORY OF COLON POLYPS: Status: ACTIVE | Noted: 2020-11-12

## 2023-08-06 PROBLEM — H35.371 EPIRETINAL MEMBRANE, RIGHT: Status: ACTIVE | Noted: 2020-09-22

## 2023-08-06 PROBLEM — K59.04 CHRONIC IDIOPATHIC CONSTIPATION: Status: ACTIVE | Noted: 2022-03-31

## 2023-08-06 NOTE — PROGRESS NOTES
Subjective     Patient ID: Kimberly Tomas is a 67 y.o. female.    Chief Complaint: Referral    HPI      Established pt of Center, [facility] (new to me)    Diagnosed with focal onset seizures, followed by Neurology at [facility], on [place]. Desires second opinion.   Concerns of forgetfulness for years. Admits she is anxious more emotional about her health, speaks about her dtr moving away.     Has questions about current meds (lisinopril 20mg, atorvastatin 40mg)    Chronic vaginal odor, no vaginal discharge    Past Medical History:   Diagnosis Date    Hyperlipidemia     Hypertension     Seizures      Social History     Tobacco Use    Smoking status: Never    Smokeless tobacco: Never   Substance Use Topics    Alcohol use: No    Drug use: No     Review of patient's allergies indicates:  No Known Allergies    History reviewed. No pertinent surgical history.    Family History   Problem Relation Age of Onset    Hypertension Mother        Current Outpatient Medications:     aspirin 81 MG Chew, Take 1 tablet by mouth once daily., Disp: , Rfl:     atorvastatin (LIPITOR) 40 MG tablet, Take 1 tablet by mouth once daily., Disp: , Rfl:     fexofenadine (ALLEGRA) 180 MG tablet, Take 1 tablet (180 mg total) by mouth nightly as needed (cold symptoms)., Disp: 10 tablet, Rfl: 0    fluticasone propionate (FLONASE) 50 mcg/actuation nasal spray, 1 spray (50 mcg total) by Each Nostril route 2 (two) times daily as needed for Rhinitis., Disp: 15 g, Rfl: 0    ibuprofen (ADVIL,MOTRIN) 600 MG tablet, Take 1 tablet (600 mg total) by mouth every 6 (six) hours as needed for Pain., Disp: 20 tablet, Rfl: 0    levETIRAcetam (KEPPRA) 500 MG Tab, Take 1 tablet by mouth 2 (two) times daily., Disp: , Rfl:     lisinopriL-hydrochlorothiazide (PRINZIDE,ZESTORETIC) 20-25 mg Tab, Take 1 tablet by mouth once daily., Disp: , Rfl:     valACYclovir (VALTREX) 1000 MG tablet, Take 1 tablet (1,000 mg total) by mouth every 12 (twelve) hours. for 10 days,  Disp: 20 tablet, Rfl: 0      Review of Systems   Constitutional:  Negative for chills, fever and unexpected weight change.   Respiratory:  Negative for cough, shortness of breath and wheezing.    Cardiovascular:  Negative for chest pain and leg swelling.   Gastrointestinal:  Negative for abdominal pain, nausea and vomiting.   Integumentary:  Negative for rash.   Neurological:  Negative for weakness and headaches. Memory loss: forgetfullness.         Objective      Physical Exam  Vitals reviewed.   Constitutional:       General: She is not in acute distress.     Appearance: She is well-developed.   HENT:      Head: Normocephalic and atraumatic.      Right Ear: Tympanic membrane, ear canal and external ear normal.      Left Ear: Tympanic membrane, ear canal and external ear normal.   Cardiovascular:      Rate and Rhythm: Normal rate and regular rhythm.      Heart sounds: No murmur heard.  Pulmonary:      Effort: Pulmonary effort is normal.      Breath sounds: Normal breath sounds. No wheezing or rales.   Abdominal:      General: Bowel sounds are normal.      Palpations: Abdomen is soft.      Tenderness: There is no abdominal tenderness.   Musculoskeletal:      Right lower leg: No edema.      Left lower leg: No edema.   Skin:     General: Skin is warm and dry.      Findings: No rash.   Neurological:      Mental Status: She is alert.   Psychiatric:         Attention and Perception: Attention normal.         Mood and Affect: Mood is anxious. Affect is tearful.         Speech: Speech normal.         Behavior: Behavior normal. Behavior is cooperative.         Thought Content: Thought content does not include homicidal or suicidal ideation.            Assessment and Plan     1. Seizure disorder  -     Ambulatory referral/consult to Neurology; Future; Expected date: 08/01/2023  -     Ambulatory referral/consult to Adult Neuropsychology; Future; Expected date: 08/01/2023    2. Hypertension, unspecified type  BP slightly  elevated today  Advised to monitor, continue lisinopril 20mg  Discussed BP goals    3. Depressed mood  -     Ambulatory referral/consult to Adult Neuropsychology; Future; Expected date: 08/01/2023  -     Ambulatory referral/consult to Psychiatry; Future; Expected date: 08/01/2023    4. Forgetfulness  -     Ambulatory referral/consult to Adult Neuropsychology; Future; Expected date: 08/01/2023    5. Anxiety about health  -     Ambulatory referral/consult to Psychiatry; Future; Expected date: 08/01/2023    6. Vaginal odor  -     Ambulatory referral/consult to Gynecology; Future; Expected date: 08/01/2023        Patient Instructions   I have referred you to our Neurology department for second opinion regarding seizure history    I have referred you to GYN for your vaginal concerns    Recommend to schedule with our therapist at [phone]    Keep appt with Dr. Mendez in September to be your new PCP    Continue all your current meds.       Future Appointments   Date Time Provider Department Center   9/14/2023  3:45 PM Twin Fernandez MD [id number]   9/22/2023  1:00 PM José Antonio Mendez MD [facility]   10/10/2023  1:20 PM Luis Alberto Giron MD [facility]

## 2023-08-10 ENCOUNTER — HOSPITAL ENCOUNTER (EMERGENCY)
Facility: HOSPITAL | Age: 68
Discharge: HOME | End: 2023-08-10
Attending: EMERGENCY MEDICINE
Payer: COMMERCIAL

## 2023-08-10 VITALS
HEART RATE: 61 BPM | WEIGHT: 215 LBS | RESPIRATION RATE: 16 BRPM | BODY MASS INDEX: 35.78 KG/M2 | DIASTOLIC BLOOD PRESSURE: 80 MMHG | OXYGEN SATURATION: 100 % | TEMPERATURE: 98 F | SYSTOLIC BLOOD PRESSURE: 151 MMHG

## 2023-08-10 DIAGNOSIS — M54.9 BACK PAIN: ICD-10-CM

## 2023-08-10 LAB
ALBUMIN SERPL BCP-MCNC: 3.5 G/DL (ref 3.5–5.2)
ALP SERPL-CCNC: 86 U/L (ref 55–135)
ALT SERPL W/O P-5'-P-CCNC: 10 U/L (ref 10–44)
ANION GAP SERPL CALC-SCNC: 9 MMOL/L (ref 8–16)
AST SERPL-CCNC: 20 U/L (ref 10–40)
BASOPHILS # BLD AUTO: 0.02 K/UL (ref 0–0.2)
BASOPHILS NFR BLD: 0.4 % (ref 0–1.9)
BILIRUB SERPL-MCNC: 0.5 MG/DL (ref 0.1–1)
BILIRUB UR QL STRIP: NEGATIVE
BUN SERPL-MCNC: 5 MG/DL (ref 8–23)
CALCIUM SERPL-MCNC: 9.3 MG/DL (ref 8.7–10.5)
CHLORIDE SERPL-SCNC: 109 MMOL/L (ref 95–110)
CLARITY UR REFRACT.AUTO: CLEAR
CO2 SERPL-SCNC: 23 MMOL/L (ref 23–29)
COLOR UR AUTO: YELLOW
CREAT SERPL-MCNC: 0.8 MG/DL (ref 0.5–1.4)
DIFFERENTIAL METHOD: NORMAL
EOSINOPHIL # BLD AUTO: 0.1 K/UL (ref 0–0.5)
EOSINOPHIL NFR BLD: 1.1 % (ref 0–8)
ERYTHROCYTE [DISTWIDTH] IN BLOOD BY AUTOMATED COUNT: 14.1 % (ref 11.5–14.5)
EST. GFR  (NO RACE VARIABLE): >60 ML/MIN/1.73 M^2
GLUCOSE SERPL-MCNC: 90 MG/DL (ref 70–110)
GLUCOSE UR QL STRIP: NEGATIVE
HCT VFR BLD AUTO: 39.6 % (ref 37–48.5)
HCV AB SERPL QL IA: NORMAL
HGB BLD-MCNC: 12.7 G/DL (ref 12–16)
HGB UR QL STRIP: NEGATIVE
HIV 1+2 AB+HIV1 P24 AG SERPL QL IA: NORMAL
IMM GRANULOCYTES # BLD AUTO: 0.01 K/UL (ref 0–0.04)
IMM GRANULOCYTES NFR BLD AUTO: 0.2 % (ref 0–0.5)
KETONES UR QL STRIP: NEGATIVE
LEUKOCYTE ESTERASE UR QL STRIP: NEGATIVE
LYMPHOCYTES # BLD AUTO: 1.1 K/UL (ref 1–4.8)
LYMPHOCYTES NFR BLD: 22.9 % (ref 18–48)
MAGNESIUM SERPL-MCNC: 1.9 MG/DL (ref 1.6–2.6)
MCH RBC QN AUTO: 28.7 PG (ref 27–31)
MCHC RBC AUTO-ENTMCNC: 32.1 G/DL (ref 32–36)
MCV RBC AUTO: 89 FL (ref 82–98)
MONOCYTES # BLD AUTO: 0.4 K/UL (ref 0.3–1)
MONOCYTES NFR BLD: 9.6 % (ref 4–15)
NEUTROPHILS # BLD AUTO: 3 K/UL (ref 1.8–7.7)
NEUTROPHILS NFR BLD: 65.8 % (ref 38–73)
NITRITE UR QL STRIP: NEGATIVE
NRBC BLD-RTO: 0 /100 WBC
PH UR STRIP: 7 [PH] (ref 5–8)
PLATELET # BLD AUTO: 209 K/UL (ref 150–450)
PMV BLD AUTO: 10.4 FL (ref 9.2–12.9)
POTASSIUM SERPL-SCNC: 4.2 MMOL/L (ref 3.5–5.1)
PROT SERPL-MCNC: 7.1 G/DL (ref 6–8.4)
PROT UR QL STRIP: NEGATIVE
RBC # BLD AUTO: 4.43 M/UL (ref 4–5.4)
SODIUM SERPL-SCNC: 141 MMOL/L (ref 136–145)
SP GR UR STRIP: 1.01 (ref 1–1.03)
URN SPEC COLLECT METH UR: NORMAL
WBC # BLD AUTO: 4.59 K/UL (ref 3.9–12.7)

## 2023-08-10 PROCEDURE — 93005 ELECTROCARDIOGRAM TRACING: CPT

## 2023-08-10 PROCEDURE — 96361 HYDRATE IV INFUSION ADD-ON: CPT

## 2023-08-10 PROCEDURE — 80053 COMPREHEN METABOLIC PANEL: CPT | Performed by: EMERGENCY MEDICINE

## 2023-08-10 PROCEDURE — 63600175 PHARM REV CODE 636 W HCPCS: Performed by: EMERGENCY MEDICINE

## 2023-08-10 PROCEDURE — 25500020 PHARM REV CODE 255: Performed by: EMERGENCY MEDICINE

## 2023-08-10 PROCEDURE — 96374 THER/PROPH/DIAG INJ IV PUSH: CPT | Mod: 59

## 2023-08-10 PROCEDURE — 93010 ELECTROCARDIOGRAM REPORT: CPT | Mod: ,,, | Performed by: INTERNAL MEDICINE

## 2023-08-10 PROCEDURE — 83735 ASSAY OF MAGNESIUM: CPT | Performed by: EMERGENCY MEDICINE

## 2023-08-10 PROCEDURE — 25000003 PHARM REV CODE 250: Performed by: EMERGENCY MEDICINE

## 2023-08-10 PROCEDURE — 93010 EKG 12-LEAD: ICD-10-PCS | Mod: ,,, | Performed by: INTERNAL MEDICINE

## 2023-08-10 PROCEDURE — 85025 COMPLETE CBC W/AUTO DIFF WBC: CPT | Performed by: EMERGENCY MEDICINE

## 2023-08-10 PROCEDURE — 87389 HIV-1 AG W/HIV-1&-2 AB AG IA: CPT | Performed by: PHYSICIAN ASSISTANT

## 2023-08-10 PROCEDURE — 86803 HEPATITIS C AB TEST: CPT | Performed by: PHYSICIAN ASSISTANT

## 2023-08-10 PROCEDURE — 81003 URINALYSIS AUTO W/O SCOPE: CPT | Performed by: EMERGENCY MEDICINE

## 2023-08-10 PROCEDURE — 99285 EMERGENCY DEPT VISIT HI MDM: CPT | Mod: 25

## 2023-08-10 RX ORDER — METHOCARBAMOL 500 MG/1
1000 TABLET, FILM COATED ORAL 3 TIMES DAILY
Qty: 30 TABLET | Refills: 0 | Status: SHIPPED | OUTPATIENT
Start: 2023-08-10 | End: 2023-08-15

## 2023-08-10 RX ORDER — LIDOCAINE 50 MG/G
1 PATCH TOPICAL DAILY
Qty: 10 PATCH | Refills: 0 | Status: SHIPPED | OUTPATIENT
Start: 2023-08-10 | End: 2023-08-20

## 2023-08-10 RX ORDER — ACETAMINOPHEN 500 MG
1000 TABLET ORAL
Status: COMPLETED | OUTPATIENT
Start: 2023-08-10 | End: 2023-08-10

## 2023-08-10 RX ORDER — LIDOCAINE 50 MG/G
1 PATCH TOPICAL
Status: DISCONTINUED | OUTPATIENT
Start: 2023-08-10 | End: 2023-08-10 | Stop reason: HOSPADM

## 2023-08-10 RX ORDER — KETOROLAC TROMETHAMINE 30 MG/ML
10 INJECTION, SOLUTION INTRAMUSCULAR; INTRAVENOUS
Status: COMPLETED | OUTPATIENT
Start: 2023-08-10 | End: 2023-08-10

## 2023-08-10 RX ADMIN — IOHEXOL 100 ML: 350 INJECTION, SOLUTION INTRAVENOUS at 08:08

## 2023-08-10 RX ADMIN — ACETAMINOPHEN 1000 MG: 500 TABLET ORAL at 07:08

## 2023-08-10 RX ADMIN — KETOROLAC TROMETHAMINE 10 MG: 30 INJECTION INTRAMUSCULAR; INTRAVENOUS at 07:08

## 2023-08-10 RX ADMIN — SODIUM CHLORIDE 500 ML: 9 INJECTION, SOLUTION INTRAVENOUS at 07:08

## 2023-08-10 RX ADMIN — LIDOCAINE 5% 1 PATCH: 700 PATCH TOPICAL at 07:08

## 2023-08-10 NOTE — ED PROVIDER NOTES
Encounter Date: 8/10/2023       History     Chief Complaint   Patient presents with    Back Pain     C/o L sided back pain starting yesterday. No recent trauma/falls. No medications taken at home. Pt states pain increases with ambulation.      HPI  67-year-old female, with history of hypertension, hyperlipidemia, seizure, B12 deficiency, presents to the ED for left back pain.  Patient reports that her left lower back pain started suddenly yesterday while walking.  Described the pain as aching pain localized to her left lower back, no radiation, no numbness or weakness down to her legs.  Pain is worse with walking, or changing sitting or lying positions.  Denies any recent fall, injury to the back.  No history of chronic back pain.  Patient reports increased urinary frequency since last night associated with nausea, but denies fever, chill, dysuria.  Review of patient's allergies indicates:  No Known Allergies  Past Medical History:   Diagnosis Date    Hyperlipidemia     Hypertension     Seizures      History reviewed. No pertinent surgical history.  Family History   Problem Relation Age of Onset    Hypertension Mother      Social History     Tobacco Use    Smoking status: Never    Smokeless tobacco: Never   Substance Use Topics    Alcohol use: No    Drug use: No     Review of Systems    Physical Exam     Initial Vitals [08/10/23 0633]   BP Pulse Resp Temp SpO2   (!) 151/80 61 16 97.5 °F (36.4 °C) 100 %      MAP       --         Physical Exam    Nursing note and vitals reviewed.  Constitutional: She appears well-developed. No distress.   HENT:   Head: Normocephalic and atraumatic.   Mouth/Throat: Oropharynx is clear and moist.   Eyes: Conjunctivae and EOM are normal.   Neck: No JVD present.   Normal range of motion.  Cardiovascular:  Normal rate, regular rhythm, normal heart sounds and intact distal pulses.           Pulmonary/Chest: Breath sounds normal. No respiratory distress.   Abdominal: Abdomen is soft. She  exhibits no distension. There is no abdominal tenderness.   Musculoskeletal:         General: Tenderness (Left lower paraspinal tenderness to palpation, positive CVA tenderness, negative leg rise test, distal neurovascular intact) present. No edema. Normal range of motion.      Cervical back: Normal range of motion.      Comments: Patient is ambulatory without assistant     Neurological: She is alert and oriented to person, place, and time. She has normal strength.   Skin: Skin is warm and dry. Capillary refill takes less than 2 seconds.         ED Course   Procedures  Labs Reviewed   COMPREHENSIVE METABOLIC PANEL - Abnormal; Notable for the following components:       Result Value    BUN 5 (*)     All other components within normal limits   URINALYSIS, REFLEX TO URINE CULTURE    Narrative:     Specimen Source->Urine   CBC W/ AUTO DIFFERENTIAL   MAGNESIUM   HIV 1 / 2 ANTIBODY    Narrative:     Release to patient->Immediate   HEPATITIS C ANTIBODY    Narrative:     Release to patient->Immediate     EKG Readings: (Independently Interpreted)   Initial Reading: No STEMI. Previous EKG: Compared with most recent EKG Rhythm: Normal Sinus Rhythm. Heart Rate: 51. Ectopy: No Ectopy. Conduction: Normal. ST Segments: Normal ST Segments. T Waves: Normal. Axis: Normal. Clinical Impression: Normal Sinus Rhythm     ECG Results              EKG 12-lead (Final result)  Result time 08/10/23 11:07:04      Final result by Interface, Lab In [facility] (08/10/23 11:07:04)                   Narrative:    Test Reason : M54.9,    Vent. Rate : 051 BPM     Atrial Rate : 051 BPM     P-R Int : 182 ms          QRS Dur : 084 ms      QT Int : 448 ms       P-R-T Axes : 008 003 050 degrees     QTc Int : 412 ms    Sinus bradycardia with marked sinus arrythmia  Moderate voltage criteria for LVH, may be normal variant ( R in aVL ,   Howell product )  Borderline Abnormal ECG  When compared with ECG of 23-JAN-2023 17:11,  Premature atrial complexes are no  longer Present  Confirmed by KRISTINA KINSEY MD ([id number]) on 8/10/2023 11:06:49 AM    Referred By: [email or web address]   SELF           Confirmed By:KRISTINA KINSEY MD                                  Imaging Results              CT Abdomen Pelvis With Contrast (Final result)  Result time 08/10/23 08:46:34      Final result by Marcus Gamboa MD (08/10/23 08:46:34)                   Impression:      1. No definite acute findings identified in the abdomen or pelvis to account for the patient's reported symptoms.  2. Details of chronic and incidental findings, as provided in the body of report.      Electronically signed by: Marcus Gamboa  Date:    08/10/2023  Time:    08:46               Narrative:    EXAMINATION:  CT ABDOMEN PELVIS WITH CONTRAST    CLINICAL HISTORY:  Flank pain, kidney stone suspected;UTI, recurrent/complicated (Female);    TECHNIQUE:  Low dose axial images, sagittal and coronal reformations were obtained from the lung bases to the pubic symphysis following the IV administration of 100 mL of Omnipaque 350    COMPARISON:  None.    FINDINGS:  Lower chest: Unremarkable.    Liver: Normal contour.  Question calcified granuloma.    Gallbladder and bile ducts: Unremarkable. No biliary ductal dilatation.    Pancreas: Unremarkable.    Spleen: Suspect calcified granuloma.    Adrenals: Unremarkable.    Kidneys: Unremarkable.    Lymph nodes: No abdominal or pelvic lymphadenopathy.    Bowel and mesentery: No definite evidence of bowel obstruction.  Large volume colonic stool.  Areas of submucosal fat attenuation involving the region the cecum and proximal colon would be in keeping with sequela of chronic nonspecific inflammation.Normal appearance of appendix.  Note is made of a somewhat mobile appearance of the cecum, which appears slightly to the left of anticipated orthotopic location, within the midline lower pelvis.    Abdominal aorta: Nonaneurysmal.  Mild atherosclerosis.    Inferior vena cava:  Unremarkable.    Free fluid or free air: None.    Pelvis: Unremarkable.  Bladder appears decompressed, limiting assessment.    Body wall: Unremarkable.    Bones: No definite acute findings.  Relatively modest degenerative findings involving the spine, hip joints, and pubic symphysis.                                       Medications   acetaminophen tablet 1,000 mg (1,000 mg Oral Given 8/10/23 0749)   ketorolac injection 9.999 mg (9.999 mg Intravenous Given 8/10/23 0749)   sodium chloride 0.9% bolus 500 mL 500 mL (0 mLs Intravenous Stopped 8/10/23 0849)   iohexoL (OMNIPAQUE 350) injection 100 mL (100 mLs Intravenous Given 8/10/23 0826)     Medical Decision Making:   History:   Old Medical Records: I decided to obtain old medical records.  Old Records Summarized: records from clinic visits.  Initial Assessment:   67-year-old female, with history of hypertension, hyperlipidemia, seizure, B12 deficiency, presents to the ED for left back pain.  Patient is well-appearing, afebrile, hemodynamically stable.  Differential Diagnosis:   Muscle spasm, sciatica, pyelonephritis, kidney stone, constipation, small-bowel obstruction, doubt cauda equina  Clinical Tests:   Lab Tests: Ordered and Reviewed  Radiological Study: Ordered and Reviewed  Medical Tests: Ordered and Reviewed            Attending Attestation:   Physician Attestation Statement for Resident:  As the supervising MD   Physician Attestation Statement: I have personally seen and examined this patient.   I agree with the above history.  -: No midline lumbar tenderness, lower extremity weakness, doubt epidural abscess/diskitis, cauda equina, metastatic disease, or other acute cervical pain.  Presentation was consistent with muscular pain she experienced improvement in symptoms with medications she received in the ED   As the supervising MD I agree with the above PE.     As the supervising MD I agree with the above treatment, course, plan, and disposition.                     ED Course as of 08/10/23 1622   u Aug 10, 2023   0955 CBC auto differential  No leukocytosis, anemia [NC]   0955 Comprehensive metabolic panel(!)  No electrolyte derangement, or abnormal renal function, liver enzyme within normal limit. [NC]   0955 Urinalysis, Reflex to Urine Culture Urine, Clean Catch  No UTI [NC]   0955 CT Abdomen Pelvis With Contrast  Independent interpretation, unremarkable, no kidney stone, bony fracture, or intra-abdominal process [NC]   1621 On re-examination, patient reports that pain improved with the ED treatment.  Stable to discharge home.  Given her presentation and history, likely musculoskeletal etiology.  Will prescribe Robaxin, Tylenol, and lidocaine patch for pain.  Referral to healthy back clinic.  Provided discharge instructions and return to the ED precaution.  No other questions. [NC]      ED Course User Index  [NC] Paddy Cruz MD                 Clinical Impression:   Final diagnoses:  [M54.9] Back pain        ED Disposition Condition    Discharge Stable          ED Prescriptions       Medication Sig Dispense Start Date End Date Auth. Provider    methocarbamoL (ROBAXIN) 500 MG Tab Take 2 tablets (1,000 mg total) by mouth 3 (three) times daily. for 5 days 30 tablet 8/10/2023 8/15/2023 Paddy Cruz MD    LIDOcaine (LIDODERM) 5 % Place 1 patch onto the skin once daily. Remove & Discard patch within 12 hours or as directed by MD for 10 days 10 patch 8/10/2023 8/20/2023 Paddy Cruz MD          Follow-up Information       Follow up With Specialties Details Why Contact Info    [facility] - Emergency Dept Emergency Medicine  As needed [id number]  [facility] [id number] [phone]    Primary care provider                 Paddy Cruz MD  Resident  08/10/23 1623       Messi Esposito MD  08/10/23 9655

## 2023-08-10 NOTE — DISCHARGE INSTRUCTIONS
You are prescribed Robaxin and lidocaine patch for your pain.  You can use warm compression, and Tylenol.  Please follow-up with your primary care provider within 1 week for re-evaluation.  Your referral to see healthy back clinic for further evaluation and treat.  Return to the ED if you have worsening pain, fever, loss consciousness, or other concerns.

## 2024-05-19 ENCOUNTER — HOSPITAL ENCOUNTER (EMERGENCY)
Facility: HOSPITAL | Age: 69
Discharge: HOME | End: 2024-05-19
Attending: EMERGENCY MEDICINE
Payer: MEDICARE

## 2024-05-19 VITALS
SYSTOLIC BLOOD PRESSURE: 124 MMHG | DIASTOLIC BLOOD PRESSURE: 59 MMHG | WEIGHT: 203 LBS | TEMPERATURE: 98 F | HEART RATE: 62 BPM | BODY MASS INDEX: 32.62 KG/M2 | RESPIRATION RATE: 18 BRPM | HEIGHT: 66 IN | OXYGEN SATURATION: 99 %

## 2024-05-19 DIAGNOSIS — Z01.10 NORMAL EAR EXAM: Primary | ICD-10-CM

## 2024-05-19 PROCEDURE — 99281 EMR DPT VST MAYX REQ PHY/QHP: CPT

## 2024-05-20 NOTE — ED TRIAGE NOTES
Kimberly Tomas, a 68 y.o. female presents to the ED w/ complaint of foreign body in ear. Reports a bug flying into her ear. No loss of hearing    Adult Physical Assessment  LOC: Kimberly Tomas, 68 y.o. female verified via two identifiers.  The patient is awake, alert, oriented and speaking appropriately at this time.  APPEARANCE: Patient resting comfortably and appears to be in no acute distress at this time. Patient is clean and well groomed, patient's clothing is properly fastened.  SKIN:The skin is warm and dry, color consistent with ethnicity, patient has normal skin turgor and moist mucus membranes, skin intact, no breakdown or brusing noted.  MUSCULOSKELETAL: Patient moving all extremities well, no obvious swelling or deformities noted.  RESPIRATORY: Airway is open and patent, respirations are spontaneous, patient has a normal effort and rate, no accessory muscle use noted.  CARDIAC: Patient has a normal rate and rhythm, no periphreal edema noted in any extremity, capillary refill < 3 seconds in all extremities  ABDOMEN: Soft and non tender to palpation, no abdominal distention noted. Bowel sounds present in all four quadrants.  NEUROLOGIC: Eyes open spontaneously, behavior appropriate to situation, follows commands, facial expression symmetrical, bilateral hand grasp equal and even, purposeful motor response noted, normal sensation in all extremities when touched with a finger.      Triage note:  Chief Complaint   Patient presents with    Foreign Body in Ear     Something flew in my L ear     Review of patient's allergies indicates:  No Known Allergies  Past Medical History:   Diagnosis Date    Hyperlipidemia     Hypertension     Seizures

## 2024-05-20 NOTE — ED NOTES
Pt reports she is going to come back in the am. Pt made aware of triage process and wait time. Attempted to convince pt to stay but pt states she is feeling better.

## 2024-05-20 NOTE — ED PROVIDER NOTES
Encounter Date: 5/19/2024       History     Chief Complaint   Patient presents with    Foreign Body in Ear     Something flew in my L ear     Patient is a 68-year-old female with past medical history of hypertension, hyperlipidemia, seizures who presents with sensation of foreign body in her left ear.  She states she felt something hit her external left ear and then thought maybe it went inside.  She says initially she self felt some fluttering but does not feeling anything any longer. She did put antiseptic in her hear and used a Q tip.    The history is provided by the patient.     Review of patient's allergies indicates:  No Known Allergies  Past Medical History:   Diagnosis Date    Hyperlipidemia     Hypertension     Seizures      No past surgical history on file.  Family History   Problem Relation Name Age of Onset    Hypertension Mother       Social History     Tobacco Use    Smoking status: Never    Smokeless tobacco: Never   Substance Use Topics    Alcohol use: No    Drug use: No         Physical Exam     Initial Vitals [05/19/24 1840]   BP Pulse Resp Temp SpO2   (!) 124/59 62 18 97.9 °F (36.6 °C) 99 %      MAP       --         Physical Exam    Nursing note and vitals reviewed.  Constitutional: She appears well-developed and well-nourished. She is not diaphoretic. No distress.   HENT:   Head: Normocephalic and atraumatic.   Right Ear: External ear normal.   Left Ear: External ear normal.   TM's normal bilaterally  Bilateral external canals are clear, no cerumen or foreign bodies visualized   Eyes: Conjunctivae and EOM are normal.   Neck: Neck supple.   Normal range of motion.  Cardiovascular:  Normal rate and regular rhythm.           Pulmonary/Chest: No respiratory distress.   Abdominal: She exhibits no distension.   Musculoskeletal:      Cervical back: Normal range of motion and neck supple.     Neurological: She is alert and oriented to person, place, and time. GCS score is 15. GCS eye subscore is 4. GCS  verbal subscore is 5. GCS motor subscore is 6.   Skin: Skin is warm and dry.   Psychiatric: She has a normal mood and affect. Her behavior is normal. Judgment and thought content normal.         ED Course   Procedures  Labs Reviewed - No data to display       Imaging Results    None          Medications - No data to display  Medical Decision Making  Discharged to home in stable condition, return to ED warnings given, follow up and patient care instructions given.                                          Clinical Impression:  Final diagnoses:  [Z01.10] Normal ear exam (Primary)          ED Disposition Condition    Discharge Stable          ED Prescriptions    None       Follow-up Information    None          Sheridan Madison MD  05/26/24 1764

## 2024-08-23 ENCOUNTER — OFFICE VISIT (OUTPATIENT)
Dept: URGENT CARE | Facility: CLINIC | Age: 69
End: 2024-08-23
Payer: MEDICARE

## 2024-08-23 VITALS
SYSTOLIC BLOOD PRESSURE: 132 MMHG | DIASTOLIC BLOOD PRESSURE: 68 MMHG | HEIGHT: 66 IN | BODY MASS INDEX: 32.64 KG/M2 | HEART RATE: 60 BPM | OXYGEN SATURATION: 99 % | WEIGHT: 203.06 LBS | RESPIRATION RATE: 17 BRPM | TEMPERATURE: 98 F

## 2024-08-23 DIAGNOSIS — R15.1 FECAL SMEARING: ICD-10-CM

## 2024-08-23 DIAGNOSIS — R05.9 COUGH, UNSPECIFIED TYPE: ICD-10-CM

## 2024-08-23 DIAGNOSIS — Z20.822 EXPOSURE TO CONFIRMED CASE OF COVID-19: Primary | ICD-10-CM

## 2024-08-23 LAB
CTP QC/QA: YES
SARS-COV-2 AG RESP QL IA.RAPID: NEGATIVE

## 2024-08-23 RX ORDER — LISINOPRIL 20 MG/1
TABLET ORAL
COMMUNITY
Start: 2024-07-31

## 2024-08-23 RX ORDER — ESCITALOPRAM OXALATE 10 MG/1
TABLET ORAL
COMMUNITY
Start: 2024-01-09

## 2024-08-23 RX ORDER — ALENDRONATE SODIUM 70 MG/1
TABLET ORAL
COMMUNITY
Start: 2023-09-11

## 2024-08-23 RX ORDER — NIRMATRELVIR AND RITONAVIR 300-100 MG
KIT ORAL
Qty: 30 TABLET | Refills: 0 | Status: SHIPPED | OUTPATIENT
Start: 2024-08-23

## 2024-08-23 NOTE — PROGRESS NOTES
"Subjective:      Patient ID: Kimberly Tomas is a 68 y.o. female.    Vitals:  height is 5' 6" (1.676 m) and weight is 92.1 kg (203 lb 0.7 oz). Her oral temperature is 98.2 °F (36.8 °C). Her blood pressure is 132/68 and her pulse is 60. Her respiration is 17 and oxygen saturation is 99%.     Chief Complaint: Cough    Pt c/o of cough and generalized fatigue. Fatigue has been going on for a while- months,has seen PPC for fatigue recently and obtained labs but has not reviewed the results with provider yet. She has hx of b12 deficiency and has not been taking her b12 recently she has been dx with anxiety and rx es citalopram but has not taken it yet.   Pts daughter tested positive for covid today in clinic today. Pt has pcp with [facility] and would like a referral for a pcp w/ [facility]. Pt denies cp, sob, fever, chills.   Of note during ROS pt endorsed some fecal smearing in undergarment or when she wipes even without BM. This has been occuring for months denies back pain or trauma n numbness or weakness in LE. Has a GI specialist.     Cough      Respiratory:  Positive for cough.       Objective:     Physical Exam  Constitutional: Pt oriented to person, place, and time.  Non-toxic appearance.   Patient does not appear ill. No distress. normal  HENT: No icterus or facial swelling appreciated  Head: Normocephalic and atraumatic.   Nose: No congestion.   Pulmonary/Chest: Effort normal. No stridor. No respiratory distress. Cta b/l  Abdominal: Normal appearance. Abdomen exhibits no distension.   Musculoskeletal:         General: No swelling.   Neurological: no focal deficit. Patient is alert and oriented to person, place, and time.   Skin: Skin is not diaphoretic and not pale. no jaundice  Psychiatric: Patients behavior is normal. Mood, judgment and thought content normal.     Assessment:     1. Exposure to confirmed case of COVID-19    2. Cough, unspecified type    3. Fecal smearing        Plan:       Exposure to confirmed case of " COVID-19  -     nirmatrelvir-ritonavir (PAXLOVID) 300 mg (150 mg x 2)-100 mg copackaged tablets (EUA); Take 3 tablets by mouth 2 (two) times daily. Each dose contains 2 nirmatrelvir (pink tablets) and 1 ritonavir (white tablet). Take all 3 tablets together  Dispense: 30 tablet; Refill: 0    Cough, unspecified type  -     SARS Coronavirus 2 Antigen, POCT Manual Read  -     Ambulatory referral/consult to Family Practice  -     nirmatrelvir-ritonavir (PAXLOVID) 300 mg (150 mg x 2)-100 mg copackaged tablets (EUA); Take 3 tablets by mouth 2 (two) times daily. Each dose contains 2 nirmatrelvir (pink tablets) and 1 ritonavir (white tablet). Take all 3 tablets together  Dispense: 30 tablet; Refill: 0    Fecal smearing      Patient Instructions   Rest and hydrate    Can retest for covid at home or here and if positive can start paxlovid treatment for covid as soon as possible most effective the sooner its started)    If you do test positive for covid, is advised that you hold your statin cholesterol medicine and wear a mask around others for full 10 days from symptom onset.      In addition, YOU DO NOT NEED TO TEST FOR COVID AGAIN after you have a positive result.  WHETHER A REPEAT TEST IS POSITIVE OR NEGATIVE DOES NOT CHANGE THE ABOVE RECOMMENDATIONS FOR ISOLATION OR MASK WEARING SO IT IS NOT NECESSARY!       For fatigue,     Restart your vitamin B12 and follow up with your PCP. If you want to go to an [facility] PCP, You can call our  line at [phone] and they can assist you in making this appointment    For anxiety, can start taking the escitalopram daily (in evenings ok) and follow up with a PCP    For stool issue:  I also recommend following up with your gastroenterologist and neurologist regarding your stool incontinence since it could be a functional gastro/colon issue or a neurologic issue. If it is getting worse at any time, I recommend going to the ER

## 2024-08-23 NOTE — PATIENT INSTRUCTIONS
Rest and hydrate    Can retest for covid at home or here and if positive can start paxlovid treatment for covid as soon as possible most effective the sooner its started)    If you do test positive for covid, is advised that you hold your statin cholesterol medicine and wear a mask around others for full 10 days from symptom onset.      In addition, YOU DO NOT NEED TO TEST FOR COVID AGAIN after you have a positive result.  WHETHER A REPEAT TEST IS POSITIVE OR NEGATIVE DOES NOT CHANGE THE ABOVE RECOMMENDATIONS FOR ISOLATION OR MASK WEARING SO IT IS NOT NECESSARY!       For fatigue,     Restart your vitamin B12 and follow up with your PCP. If you want to go to an [facility] PCP, You can call our  line at [phone] and they can assist you in making this appointment    For anxiety, can start taking the escitalopram daily (in evenings ok) and follow up with a PCP    For stool issue:  I also recommend following up with your gastroenterologist and neurologist regarding your stool incontinence since it could be a functional gastro/colon issue or a neurologic issue. If it is getting worse at any time, I recommend going to the ER

## 2024-12-10 ENCOUNTER — OFFICE VISIT (OUTPATIENT)
Dept: URGENT CARE | Facility: CLINIC | Age: 69
End: 2024-12-10
Payer: MEDICARE

## 2024-12-10 VITALS
TEMPERATURE: 99 F | RESPIRATION RATE: 18 BRPM | HEIGHT: 66 IN | BODY MASS INDEX: 30.41 KG/M2 | WEIGHT: 189.25 LBS | OXYGEN SATURATION: 99 % | DIASTOLIC BLOOD PRESSURE: 62 MMHG | HEART RATE: 78 BPM | SYSTOLIC BLOOD PRESSURE: 112 MMHG

## 2024-12-10 DIAGNOSIS — R05.9 COUGH, UNSPECIFIED TYPE: ICD-10-CM

## 2024-12-10 DIAGNOSIS — R42 VERTIGO: Primary | ICD-10-CM

## 2024-12-10 DIAGNOSIS — M81.0 OSTEOPOROSIS, UNSPECIFIED OSTEOPOROSIS TYPE, UNSPECIFIED PATHOLOGICAL FRACTURE PRESENCE: ICD-10-CM

## 2024-12-10 LAB
CTP QC/QA: YES
SARS-COV-2 AG RESP QL IA.RAPID: NEGATIVE

## 2024-12-10 RX ORDER — MECLIZINE HYDROCHLORIDE 25 MG/1
25 TABLET ORAL 3 TIMES DAILY PRN
Qty: 20 TABLET | Refills: 0 | Status: SHIPPED | OUTPATIENT
Start: 2024-12-10 | End: 2024-12-17

## 2024-12-10 NOTE — PROGRESS NOTES
"Subjective:      Patient ID: Kimberly Tomas is a 69 y.o. female.    Vitals:  height is 5' 6" (1.676 m) and weight is 85.9 kg (189 lb 4.2 oz). Her oral temperature is 98.6 °F (37 °C). Her blood pressure is 112/62 and her pulse is 78. Her respiration is 18 and oxygen saturation is 99%.     Chief Complaint: Cough    68 yo female c/o cough, sneezing, and sore throat. Pt states she started her sx 3 weeks ago. Pt states she has been having some falls and dizzy spells for the past months. Pt states she just hasn't been having some weakness.     Provider note:  Denies any current symptoms. She has been dealing with intermittent coughing and sneezing for 1 month. Denies taking anything for it. She also complains of increased falls in the last year due to a feeling of heaviness and loss of balance with certain positions.  Last fall was about 1 month ago. She states that she was reaching for something and lost her balance and fell. She denies hitting her head or loss of consciousness. Denies any tingling or numbness anywhere. She states that she has a lot of hand pain/cramping that is relieved with hand stretches. Experiences intermittent dizziness with turning her head too soon. No dizziness currently. Patient requesting COVID test.     Looking to switch to a PCP within the [email or web address] system.     Cough  This is a new problem. The current episode started 1 to 4 weeks ago. The problem has been unchanged. The problem occurs constantly. The cough is Non-productive. Pertinent negatives include no sore throat or shortness of breath. Nothing aggravates the symptoms. She has tried nothing for the symptoms.       HENT:  Negative for sore throat.    Respiratory:  Positive for cough. Negative for shortness of breath.    Allergic/Immunologic: Positive for sneezing.      Objective:     Physical Exam   Constitutional: She is oriented to person, place, and time. She appears well-developed. She is cooperative.  Non-toxic appearance. She does " not appear ill. No distress.      Comments:Patient sits comfortably in exam chair. Answers questions in complete sentences. Does not show any signs of distress or discoloration.        HENT:   Head: Normocephalic and atraumatic.   Ears:   Right Ear: Hearing, tympanic membrane, external ear and ear canal normal. no impacted cerumen  Left Ear: Hearing, tympanic membrane, external ear and ear canal normal. no impacted cerumen  Nose: Nose normal. No mucosal edema, rhinorrhea, nasal deformity or congestion. No epistaxis. Right sinus exhibits no maxillary sinus tenderness and no frontal sinus tenderness. Left sinus exhibits no maxillary sinus tenderness and no frontal sinus tenderness.   Mouth/Throat: Uvula is midline, oropharynx is clear and moist and mucous membranes are normal. No trismus in the jaw. Normal dentition. No uvula swelling. No oropharyngeal exudate, posterior oropharyngeal edema or posterior oropharyngeal erythema. No tonsillar exudate.   Eyes: Conjunctivae and lids are normal. No scleral icterus.   Neck: Trachea normal and phonation normal. Neck supple. No edema present. No erythema present. No neck rigidity present.   Cardiovascular: Normal rate, regular rhythm, normal heart sounds and normal pulses.   Pulmonary/Chest: Effort normal and breath sounds normal. No stridor. No respiratory distress. She has no decreased breath sounds. She has no wheezes. She has no rhonchi. She has no rales. She exhibits no tenderness.   Abdominal: Normal appearance.   Musculoskeletal: Normal range of motion.         General: No deformity. Normal range of motion.   Lymphadenopathy:     She has no cervical adenopathy.        Right cervical: No superficial cervical, no deep cervical and no posterior cervical adenopathy present.       Left cervical: No superficial cervical, no deep cervical and no posterior cervical adenopathy present.   Neurological: She is alert and oriented to person, place, and time. She exhibits normal  muscle tone. Coordination normal.   Skin: Skin is warm, dry, intact, not diaphoretic and not pale.   Psychiatric: Her speech is normal and behavior is normal. Judgment and thought content normal.   Nursing note and vitals reviewed.    Results for orders placed or performed in visit on 12/10/24   SARS Coronavirus 2 Antigen, POCT Manual Read    Collection Time: 12/10/24 11:34 AM   Result Value Ref Range    SARS Coronavirus 2 Antigen Negative Negative     Acceptable Yes        Assessment:     1. Vertigo    2. Cough, unspecified type    3. Osteoporosis, unspecified osteoporosis type, unspecified pathological fracture presence        Plan:       Vertigo  -     meclizine (ANTIVERT) 25 mg tablet; Take 1 tablet (25 mg total) by mouth 3 (three) times daily as needed for Dizziness.  Dispense: 20 tablet; Refill: 0    Cough, unspecified type  -     SARS Coronavirus 2 Antigen, POCT Manual Read    Osteoporosis, unspecified osteoporosis type, unspecified pathological fracture presence  -     Ambulatory referral/consult to Internal Medicine                Patient Instructions   A referral for Primary care has been placed. Call [phone] to schedule an appointment. Make sure to follow up in 1-2 weeks or sooner if symptoms continue or worsen.     - Rest.    - Drink plenty of fluids. Increasing your fluid intake will help loosen up mucous.  - Viral upper respiratory infections typically run their course in 10-14 days.      CONGESTION:   - You can use Flonase (fluticasone) nasal spray as directed for sinus congestion and postnasal drip. This is a steroid nasal spray that works locally over time to decrease the inflammation in your nose/sinuses and help with allergic symptoms. This is not an quick- relief spray like afrin, but it works well if used daily.  Discontinue if you develop nose bleed  - Use nasal saline prior to Flonase.  - Use Ocean Spray Nasal Saline 1-3 puffs each nostril every 2-3 hours then blow out onto  tissue. This is to irrigate the nasal passage way to clear the sinus openings. Use until sinus problem resolved.  - A Neti Pot with sterile saline can help break up nasal congestion and give relief.     COUGH:  - You can take Delsym to help with cough.     SORE THROAT:   - Chloraseptic throat spray can help numb the throat.   - Warm salt water gargles can help with sore throat.  - Warm tea with honey can help with sore throat and cough. Honey is a natural cough suppressant.     - Rest.    - Drink plenty of fluids.    - Acetaminophen (tylenol) or Ibuprofen (advil,motrin) as directed as needed for fever/pain. Avoid tylenol if you have a history of liver disease. Do not take ibuprofen if you have a history of GI bleeding, kidney disease, or if you take blood thinners.   - Ibuprofen dosing for adults: 400 mg by mouth every 4-6 hours as needed. Max: 2400 mg/day; Info: use lowest effective dose, shortest effective treatment duration; give w/ food if GI upset occurs.  - Tylenol dosing for adults: [By mouth route, immediate-release form] Dose: 325-1000 mg by mouth every 4-6h as needed; Max: 1 g/4h and 4 g/day from all sources. [By mouth route, extended-release form] Dose: 650-1300 mg Extended Release by mouth every 8h as needed; Max: 4 g/day from all sources.     - You must understand that you have received an Urgent Care treatment only and that you may be released before all of your medical problems are known or treated.   - You, the patient, will arrange for follow up care as instructed.   - If your condition worsens or fails to improve we recommend that you receive another evaluation at the ER immediately or contact your PCP to discuss your concerns or return here.   - Follow up with your PCP or specialty clinic as directed in the next 1-2 weeks if not improved or as needed.  You can call [phone] to schedule an appointment with the appropriate provider.    If your symptoms do not improve or worsen, go to the  emergency room immediately.

## 2024-12-10 NOTE — PATIENT INSTRUCTIONS
A referral for Primary care has been placed. Call [phone] to schedule an appointment. Make sure to follow up in 1-2 weeks or sooner if symptoms continue or worsen.     - Rest.    - Drink plenty of fluids. Increasing your fluid intake will help loosen up mucous.  - Viral upper respiratory infections typically run their course in 10-14 days.      CONGESTION:   - You can use Flonase (fluticasone) nasal spray as directed for sinus congestion and postnasal drip. This is a steroid nasal spray that works locally over time to decrease the inflammation in your nose/sinuses and help with allergic symptoms. This is not an quick- relief spray like afrin, but it works well if used daily.  Discontinue if you develop nose bleed  - Use nasal saline prior to Flonase.  - Use Ocean Spray Nasal Saline 1-3 puffs each nostril every 2-3 hours then blow out onto tissue. This is to irrigate the nasal passage way to clear the sinus openings. Use until sinus problem resolved.  - A Neti Pot with sterile saline can help break up nasal congestion and give relief.     COUGH:  - You can take Delsym to help with cough.     SORE THROAT:   - Chloraseptic throat spray can help numb the throat.   - Warm salt water gargles can help with sore throat.  - Warm tea with honey can help with sore throat and cough. Honey is a natural cough suppressant.     - Rest.    - Drink plenty of fluids.      - You must understand that you have received an Urgent Care treatment only and that you may be released before all of your medical problems are known or treated.   - You, the patient, will arrange for follow up care as instructed.   - If your condition worsens or fails to improve we recommend that you receive another evaluation at the ER immediately or contact your PCP to discuss your concerns or return here.   - Follow up with your PCP or specialty clinic as directed in the next 1-2 weeks if not improved or as needed.  You can call [phone] to schedule an  appointment with the appropriate provider.    If your symptoms do not improve or worsen, go to the emergency room immediately.

## 2024-12-16 ENCOUNTER — OFFICE VISIT (OUTPATIENT)
Dept: INTERNAL MEDICINE | Facility: CLINIC | Age: 69
End: 2024-12-16
Payer: MEDICARE

## 2024-12-16 ENCOUNTER — TELEPHONE (OUTPATIENT)
Dept: ADMINISTRATIVE | Facility: OTHER | Age: 69
End: 2024-12-16
Payer: MEDICARE

## 2024-12-16 VITALS
OXYGEN SATURATION: 100 % | HEIGHT: 66 IN | HEART RATE: 68 BPM | WEIGHT: 188.06 LBS | SYSTOLIC BLOOD PRESSURE: 128 MMHG | BODY MASS INDEX: 30.22 KG/M2 | DIASTOLIC BLOOD PRESSURE: 74 MMHG

## 2024-12-16 DIAGNOSIS — J30.1 NON-SEASONAL ALLERGIC RHINITIS DUE TO POLLEN: ICD-10-CM

## 2024-12-16 DIAGNOSIS — R73.03 PREDIABETES: ICD-10-CM

## 2024-12-16 DIAGNOSIS — E55.9 VITAMIN D DEFICIENCY: ICD-10-CM

## 2024-12-16 DIAGNOSIS — M81.0 OSTEOPOROSIS, UNSPECIFIED OSTEOPOROSIS TYPE, UNSPECIFIED PATHOLOGICAL FRACTURE PRESENCE: ICD-10-CM
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
## 2024-12-16 DIAGNOSIS — R42 DIZZINESS: ICD-10-CM

## 2024-12-16 DIAGNOSIS — Z01.89 NEED FOR PHYSICAL THERAPY ASSESSMENT: ICD-10-CM

## 2024-12-16 DIAGNOSIS — Z00.01 ENCOUNTER FOR PREVENTATIVE ADULT HEALTH CARE EXAM WITH ABNORMAL FINDINGS: Primary | ICD-10-CM

## 2024-12-16 DIAGNOSIS — E78.5 HYPERLIPIDEMIA, UNSPECIFIED HYPERLIPIDEMIA TYPE: ICD-10-CM

## 2024-12-16 DIAGNOSIS — E66.9 OBESITY (BMI 30-39.9): ICD-10-CM

## 2024-12-16 DIAGNOSIS — Z86.73 HISTORY OF STROKE: ICD-10-CM

## 2024-12-16 DIAGNOSIS — F32.A DEPRESSION, UNSPECIFIED DEPRESSION TYPE: ICD-10-CM

## 2024-12-16 DIAGNOSIS — R56.9 SEIZURE: ICD-10-CM

## 2024-12-16 DIAGNOSIS — Z12.31 ENCOUNTER FOR SCREENING MAMMOGRAM FOR MALIGNANT NEOPLASM OF BREAST: ICD-10-CM

## 2024-12-16 DIAGNOSIS — E53.8 B12 DEFICIENCY: ICD-10-CM

## 2024-12-16 DIAGNOSIS — R79.9 ABNORMAL FINDING OF BLOOD CHEMISTRY, UNSPECIFIED: ICD-10-CM

## 2024-12-16 DIAGNOSIS — I10 HYPERTENSION, UNSPECIFIED TYPE: ICD-10-CM

## 2024-12-16 DIAGNOSIS — F43.9 STRESS AT HOME: ICD-10-CM

## 2024-12-16 PROCEDURE — 3074F SYST BP LT 130 MM HG: CPT | Mod: CPTII,S$GLB,, | Performed by: INTERNAL MEDICINE

## 2024-12-16 PROCEDURE — 1101F PT FALLS ASSESS-DOCD LE1/YR: CPT | Mod: CPTII,S$GLB,, | Performed by: INTERNAL MEDICINE

## 2024-12-16 PROCEDURE — 1159F MED LIST DOCD IN RCRD: CPT | Mod: CPTII,S$GLB,, | Performed by: INTERNAL MEDICINE

## 2024-12-16 PROCEDURE — 3078F DIAST BP <80 MM HG: CPT | Mod: CPTII,S$GLB,, | Performed by: INTERNAL MEDICINE

## 2024-12-16 PROCEDURE — 1125F AMNT PAIN NOTED PAIN PRSNT: CPT | Mod: CPTII,S$GLB,, | Performed by: INTERNAL MEDICINE

## 2024-12-16 PROCEDURE — 3008F BODY MASS INDEX DOCD: CPT | Mod: CPTII,S$GLB,, | Performed by: INTERNAL MEDICINE

## 2024-12-16 PROCEDURE — 99215 OFFICE O/P EST HI 40 MIN: CPT | Mod: S$GLB,,, | Performed by: INTERNAL MEDICINE

## 2024-12-16 PROCEDURE — 99999 PR PBB SHADOW E&M-EST. PATIENT-LVL V: CPT | Mod: PBBFAC,,, | Performed by: INTERNAL MEDICINE

## 2024-12-16 PROCEDURE — 1160F RVW MEDS BY RX/DR IN RCRD: CPT | Mod: CPTII,S$GLB,, | Performed by: INTERNAL MEDICINE

## 2024-12-16 PROCEDURE — 4010F ACE/ARB THERAPY RXD/TAKEN: CPT | Mod: CPTII,S$GLB,, | Performed by: INTERNAL MEDICINE

## 2024-12-16 PROCEDURE — 3288F FALL RISK ASSESSMENT DOCD: CPT | Mod: CPTII,S$GLB,, | Performed by: INTERNAL MEDICINE

## 2024-12-16 RX ORDER — RISEDRONATE SODIUM 5 MG/1
5 TABLET, FILM COATED ORAL
Qty: 30 TABLET | Refills: 11 | Status: CANCELLED | OUTPATIENT
Start: 2024-12-16 | End: 2025-12-16

## 2024-12-16 RX ORDER — ALENDRONATE SODIUM 70 MG/1
70 TABLET ORAL
Qty: 4 TABLET | Refills: 11 | Status: CANCELLED | OUTPATIENT
Start: 2024-12-16 | End: 2025-12-16

## 2024-12-16 NOTE — PROGRESS NOTES
Subjective:      Patient ID: Kimberly Tomas is a 69 y.o. female.    Chief Complaint: Annual Exam and Fall (difficulty climbing stairs, walking worried about falling)      History of Present Illness      Patient presents today with concerns about falling.  She is a 69 years old  woman who presents today for primary care establishment and also evaluation patient's multiple chronic conditions also want to do annual checkup.  She is alert told her new to myself.  She has good Medical informants.  I review her previous medical records, laboratory study results, imaging study results.  It takes me additional 15 minutes.  She is living at home alone by herself her  passed away.  She has 6 children 1 daughter living with her.  She does not smoking no alcohol abuse no illegal drug use in the history.  She is very concerned fall she fell 3 times over past 2 months when she walk outside flat street and go up 2 stairs.  She fell with no injury of had the bone but have muscle pain.  She has a history of CVA stroke several years ago which has been treated with aspirin 81 mg daily she denies any reoccurrence of focal weakness sensory loss, no visual blurring, no slurring speech and swallowing difficulties.  She also developed seizure disorders complicated different CVA..  Now she is taking Keppra 500 mg daily for seizure treatment.  She has been taking Keppra for over 15 years without any seizures reoccurrence over past 2 years.  She has been not seen by a neurologist both over 3 years and she prefer to have a 2nd opinion from neurologist to see whether she needs to continue taking antiseizure medication or not.  She has polypharmacy taking over 15 different kind medications.  Today I have reviewed, discussed, counseled, and re-conciliated  all of the medication with her face-to-face.  Either takes me additional 50 minutes.  Over past 3 months she has lost body weight with anorexia poor appetite and also  developed constipation.  There are no stool color change.  Her last colonoscopy was done 7 years ago with polyps.  She is very concerned about underlying colon cancer for which she needs a colonoscopy for colon cancer screening test.  Or past 5 years ago she had mammogram bilaterally without any malignancy finding.  She has not had mammogram done over past 3 years.  In her family history there is no any breast cancer colon cancer lung cancer.  She also ha has been diagnosis osteoporosis and recommended taking Fosamax 70 mg once a week with calcium vitamin-D supplement however she stopped taking Fosamax by herself over past 2 years.  Based on her previously laboratory study she has high blood sugar fluctuated over past several month.  She also has hypertension which controlled well with lisinopril 20 mg daily.  Today blood pressure 128/74 without any active symptoms of TIA such as focal weakness, sensory loss, slurred speech, numbness or tingling of face,.  She also has a history of hyperlipidemia and was recommended to taking Lipitor 40 mg daily however she stopped taking this medication by herself over past 1 year.  She also has a history of depression anxiety which has been treated with the relaxer mg daily.  Over past 2 months she has significant stress in her family issues and also feel blue in the morning, chronic fatigue weight loss but she denies any lost most interested things to do and denies any suicidal idea, attempts, action, plans.  She is a very active independent lady without lost the events ADL function.  She can handle her own financial driving cooking shopping without any difficulties.  She has updated the vaccinations including flu vaccine, COVID vaccine, tetanus vaccine, shingles vaccine, pneumonia vaccine.  She is unable to do up and go test suggests patient has significant weakness of bilateral thigh muscle and the lower back muscle which contribute her folding.  She agree to have a physical  therapy occupational therapy evaluation and treatment  whether she need can not walk her for fall prevention.    FALLS AND BALANCE:  She reports three falls in the last year, most recent occurring one month ago. She denies associated dizziness. While she has access to her mother's cane, she does not use it when outside.    MUSCULOSKELETAL:  She reports bilateral leg weakness and muscle pain affecting both legs and arms. She experiences hand cramps requiring manual manipulation for relief. She has night pain in legs that requires standing for relief. She denies taking any pain medications for these symptoms.    MEDICAL HISTORY:  She has a history of stroke and seizures, with the last seizure occurring 8 years ago. She underwent bilateral cataract surgery 6 months ago.    GASTROINTESTINAL:  She reports constipation requiring medication for management. She has a history of colon polyps identified during colonoscopy 7 years ago.    MEDICATIONS:  She takes Keppra for seizures, baby aspirin, Lisinopril for blood pressure, and occasional ibuprofen.    SOCIAL HISTORY:  She lives with her daughter who is planning to move out. She has no smoking history and denies alcohol or marijuana use.    WEIGHT:  Her current weight is 183 lbs with poor appetite.    FAMILY HISTORY:  No family history of colon cancer.      ROS:  General: -fever, -chills, -fatigue, -weight gain, -weight loss, +loss of appetite  Eyes: -vision changes, -redness, -discharge  ENT: -ear pain, -nasal congestion, -sore throat  Cardiovascular: -chest pain, -palpitations, -lower extremity edema  Respiratory: -cough, -shortness of breath  Gastrointestinal: -abdominal pain, -nausea, -vomiting, -diarrhea, +constipation, -blood in stool  Genitourinary: -dysuria, -hematuria, -frequency  Musculoskeletal: -joint pain, -muscle pain, +muscle weakness, +muscle cramps  Skin: -rash, -lesion  Neurological: -headache, -dizziness, -numbness, -tingling  Psychiatric: -anxiety,  -depression, -sleep difficulty         Active Problem List with Overview Notes    Diagnosis Date Noted    Seizure 11/21/2022    Peripheral vertigo 11/21/2022    Chronic idiopathic constipation 03/31/2022    Prediabetes 03/31/2022    History of colon polyps 11/12/2020     Formatting of this note might be different from the original.  Added automatically from request for surgery [id number]      Epiretinal membrane, right 09/22/2020    Lamellar macular hole of right eye 09/22/2020    Peripheral drusen of both eyes 09/22/2020    Dyslipidemia 04/05/2019    History of stroke 04/05/2019    B12 deficiency 02/12/2016    Vitamin D deficiency 02/12/2016          Current Outpatient Medications:     fluticasone propionate (FLONASE) 50 mcg/actuation nasal spray, 1 spray (50 mcg total) by Each Nostril route 2 (two) times daily as needed for Rhinitis., Disp: 15 g, Rfl: 0    ibuprofen (ADVIL,MOTRIN) 600 MG tablet, Take 1 tablet (600 mg total) by mouth every 6 (six) hours as needed for Pain., Disp: 20 tablet, Rfl: 0    lisinopriL (PRINIVIL,ZESTRIL) 20 MG tablet, TAKE 1 TABLET BY MOUTH EVERY DAY Oral, Disp: , Rfl:     aspirin 81 MG Chew, Take 1 tablet by mouth once daily. (Patient not taking: Reported on 12/16/2024), Disp: , Rfl:     levETIRAcetam (KEPPRA) 500 MG Tab, Take 1 tablet by mouth 2 (two) times daily. (Patient not taking: Reported on 12/16/2024), Disp: , Rfl:     VITAMIN B COMPLEX ORAL, Take 1 tablet by mouth once daily. (Patient not taking: Reported on 12/16/2024), Disp: , Rfl:   Review of patient's allergies indicates:  No Known Allergies     Past Medical History:   Diagnosis Date    Hyperlipidemia     Hypertension     Seizures      History reviewed. No pertinent surgical history.  Social History     Social History Narrative    Not on file     Family History   Problem Relation Name Age of Onset    Hypertension Mother         Vitals:    12/16/24 0958   BP: 128/74   Pulse: 68   SpO2: 100%   Weight: 85.3 kg (188 lb 0.8 oz)  "  Height: 5' 6" (1.676 m)   PainSc:   5           Lab Results   Component Value Date    HGBA1C 5.1 06/26/2023    HGBA1C 5.6 05/23/2022    HGBA1C 5.4 04/16/2021     No results found for: "MICALBCREAT"  Lab Results   Component Value Date    LDLCALC 77 05/23/2022    LDLCALC 71 10/23/2020    CHOL 141 05/23/2022    HDL 55 05/23/2022    TRIG 45 05/23/2022       Lab Results   Component Value Date     08/10/2023    K 4.2 08/10/2023     08/10/2023    CO2 23 08/10/2023    GLU 90 08/10/2023    BUN 5 (L) 08/10/2023    CREATININE 0.8 08/10/2023    CALCIUM 9.3 08/10/2023    PROT 7.1 08/10/2023    ALBUMIN 3.5 08/10/2023    BILITOT 0.5 08/10/2023    ALKPHOS 86 08/10/2023    AST 20 08/10/2023    ALT 10 08/10/2023    ANIONGAP 9 08/10/2023    ESTGFRAFRICA 83 (L) 06/26/2023    WBC 4.59 08/10/2023    HGB 12.7 08/10/2023    HGB 12.8 01/23/2023    HCT 39.6 08/10/2023    MCV 89 08/10/2023     08/10/2023    TSH 1.50 09/18/2024    HEPCAB Non-reactive 08/10/2023       No results found for: "LH", "FSH", "TOTALTESTOST", "PROGESTERONE", "ESTRADIOL", "JPFOSSIZ54SW", "QLMOVZQY50", "FERRITIN", "IRON", "TRANSFERRIN", "TIBC", "FESATURATED", "ZINC"    Objective:   Physical Exam   Physical Exam    General: No acute distress. Well-developed. Well-nourished.  Eyes: EOMI. Sclerae anicteric.  HENT: Normocephalic. Atraumatic. Nares patent. Moist oral mucosa.  Ears: Bilateral TMs clear. Bilateral EACs clear.  Cardiovascular: Regular rate. Regular rhythm. No murmurs. No rubs. No gallops. Normal S1, S2.  Respiratory: Normal respiratory effort. Clear to auscultation bilaterally. No rales. No rhonchi. No wheezing.  Abdomen: Soft. Non-tender. Non-distended. Normoactive bowel sounds.  Musculoskeletal: No  obvious deformity. Thigh muscles weak.  Extremities: No lower extremity edema.  Neurological: Alert & oriented x3. No slurred speech. Normal gait.  Psychiatric: Normal mood. Normal affect. Good insight. Good judgment.  Skin: Warm. Dry. No rash. "         Assessment:     1. Encounter for preventative adult health care exam with abnormal findings    2. Hypertension, unspecified type    3. Hyperlipidemia, unspecified hyperlipidemia type    4. Vitamin D deficiency    5. Osteoporosis, unspecified osteoporosis type, unspecified pathological fracture presence    6. Prediabetes    7. B12 deficiency    8. History of stroke    9. Dizziness    10. Non-seasonal allergic rhinitis due to pollen    11. Depression, unspecified depression type    12. Seizure    13. Obesity (BMI 30-39.9)    14. Stress at home    15. Fall (on) (from) other stairs and steps, initial encounter    16. Need for physical therapy assessment      Plan:   Assessment & Plan   Encounter for preventative adult health care exam with abnormal findings    Hypertension, unspecified type    Hyperlipidemia, unspecified hyperlipidemia type    Vitamin D deficiency    Osteoporosis, unspecified osteoporosis type, unspecified pathological fracture presence    Prediabetes    B12 deficiency    History of stroke    Dizziness    Non-seasonal allergic rhinitis due to pollen    Depression, unspecified depression type    Seizure    Obesity (BMI 30-39.9)    Stress at home    Fall (on) (from) other stairs and steps, initial encounter    Need for physical therapy assessment       Assessment & Plan    IMPRESSION:  - Assessed fall risk due to patient reports of multiple falls in past year and difficulty standing without support  - Evaluated medication regimen, removing several medications patient is no longer taking  - Considered need for preventive screenings due to patient age and time since last screenings  - Assessed for depression and anxiety, noting patient worries about children but no current suicidal ideation  - Evaluated seizure management, noting no seizures in past 8 years while on current medication    FALL PREVENTION AND MOBILITY:  - Explained importance of using assistive devices like canes or walkers to prevent  falls.  - Patient to use cane when going outside to prevent falls.  - Patient to consider using walker based on physical therapy assessment.  - Referred to Physical therapy for gait assessment and fall prevention.    HYPERTENSION:  - Continued Lisinopril (Zestril) daily for blood pressure control.    EPILEPSY:  - Continued Keppra for seizure prevention.    STROKE AND HEART DISEASE PREVENTION:  - Educated on the role of baby aspirin in preventing stroke and heart conditions.  - Continued baby aspirin daily for stroke and heart condition prevention.    OSTEOPOROSIS:  - Continued calcium supplement.  - Ordered bone density scan (DEXA).    HYPERLIPIDEMIA:  - Ordered lipid panel.    GENERAL HEALTH SCREENING AND MANAGEMENT:  - Discussed importance of regular preventive screenings such as colonoscopy and mammogram.  - Ordered CBC, CMP, Hemoglobin A1C, and Vitamin D level.  - Ordered mammogram.  - Referred for colonoscopy.    FOLLOW-UP:  - Follow up in 2 months to review test results and specialist findings.  - Contact office if need medication refills before next appointment.              Follow up in about 2 months (around 2/16/2025).     There are no Patient Instructions on file for this visit.  [unfilled]   Tests to Keep You Healthy    Mammogram: Met on 2/26/2024  Colon Cancer Screening: DUE  Last Blood Pressure <= 139/89 (12/16/2024): NO      Visit Checklist (as applicable):  1. Status of new and prior symptoms discussed? yes  2. Imaging reviewed/ ordered as appropriate? yes  3. Lab study reviewed/ ordered as appropriate? yes  4. Plan for work-up and treatment discussed with patient? yes  5. Potential medication side-effects and monitoring plan discussed? yes  6. Review of outside medical records was performed and pertinent details are summarized in the HPI above? yes     Time spent on this encounter: 60 minutes. This includes face to face time and non-face to face time preparing to see the patient (eg, review of  tests), obtaining and/or reviewing separately obtained history, documenting clinical information in the electronic or other health record, independently interpreting results (not separately reported) and communicating results to the patient/family/caregiver, or care coordination (not separately reported). Also patient education regarding chronic and acute medical conditions reviewed. Patient handouts given if appropriate.     Each patient to whom he or she provides medical services by telemedicine is:  (1) informed of the relationship between the physician and patient and the respective role of any other health care provider with respect to management of the patient; and (2) notified that he or she may decline to receive medical services by telemedicine and may withdraw from such care at any time.     This note was generated with the assistance of ambient listening technology. Verbal consent was obtained by the patient and accompanying visitor(s) for the recording of patient appointment to facilitate this note. I attest to having reviewed and edited the generated note for accuracy, though some syntax or spelling errors may persist. Please contact the author of this note for any clarification.       Sabas Lane MD  [facility]

## 2024-12-17 ENCOUNTER — TELEPHONE (OUTPATIENT)
Dept: INTERNAL MEDICINE | Facility: CLINIC | Age: 69
End: 2024-12-17
Payer: MEDICARE

## 2024-12-17 NOTE — LETTER
December 17, 2024    Kimberly Tomas  [address] [place] [address]             [facility]  [address] [facility] [address]  Phone: [phone]  Fax: [phone] Dear Ms. Tomas:    Please call [facility] Work department for assistance with looking for housing. [phone]    If you have any questions or concerns, please don't hesitate to call.    Sincerely,        Sabas Lane MD

## 2024-12-17 NOTE — TELEPHONE ENCOUNTER
"Left a voicemail, instructing patient to call [phone] to discuss housing with a , per [facility] Referral Team.     Letter mailed out with appointments and this information on 12/17/24.    "  Ashleigh Saleh MA Spotts, Gillian, LPN  Cc: Makayla Tolentino  Good Morning, Please be advised of message below. Thanks          Previous Messages       ----- Message -----  From: Makayla Tolentino  Sent: 12/16/2024   3:40 PM CST  To: Ashleigh Saleh MA    Our dept doesn't handle social work and PT. Someone contacted pt regarding PT referral. Pt can call psych for social work assistance [phone].    Makayla  Physician Referral Specialist Supervisor  [facility]  [phone]  ----- Message -----  From: Ashleigh Saleh MA  Sent: 12/16/2024   1:30 PM CST  To: Makayla Benavides, I am not sure how to help with this referral message. Please advise. Thanks so much.  ----- Message -----  From: Anita Grewal LPN  Sent: 12/16/2024   1:23 PM CST  To: Sabas Lane MD; [facility] Referral Coordinator    Hello,  This referral, along with Social Work and Phys.Therapy for this patient. She is looking for different housing, like senior living. She is safe just needs to understand her options and be assessed for fall precautions and safety.  Thank you,  WILFRID Grewal LPN    "  "

## 2024-12-21 ENCOUNTER — CLINICAL SUPPORT (OUTPATIENT)
Dept: ENDOSCOPY | Facility: HOSPITAL | Age: 69
End: 2024-12-21
Attending: INTERNAL MEDICINE
Payer: MEDICARE

## 2024-12-21 ENCOUNTER — TELEPHONE (OUTPATIENT)
Dept: ENDOSCOPY | Facility: HOSPITAL | Age: 69
End: 2024-12-21

## 2024-12-21 VITALS — BODY MASS INDEX: 29.73 KG/M2 | HEIGHT: 66 IN | WEIGHT: 185 LBS

## 2024-12-21 DIAGNOSIS — Z00.01 ENCOUNTER FOR PREVENTATIVE ADULT HEALTH CARE EXAM WITH ABNORMAL FINDINGS: Primary | ICD-10-CM

## 2024-12-21 DIAGNOSIS — Z86.0100 HX OF COLONIC POLYPS: Primary | ICD-10-CM

## 2024-12-21 DIAGNOSIS — Z00.01 ENCOUNTER FOR PREVENTATIVE ADULT HEALTH CARE EXAM WITH ABNORMAL FINDINGS: ICD-10-CM

## 2024-12-21 NOTE — TELEPHONE ENCOUNTER
Referral for procedure from PAT appointment      Spoke to Kimberly to schedule procedure(s) Colonoscopy       Physician to perform procedure(s) Dr. TACHO Joaquin  Date of Procedure (s) 1/8/25  Arrival Time 2:10 PM  Time of Procedure(s) 3:10 PM   Location of Procedure(s) [place] 4th Floor  Type of Rx Prep sent to patient: PEG  Instructions provided to patient via Postal Mail    Patient was informed on the following information and verbalized understanding. Screening questionnaire reviewed with patient and complete. If procedure requires anesthesia, a responsible adult needs to be present to accompany the patient home, patient cannot drive after receiving anesthesia. Appointment details are tentative, especially check-in time. Patient will receive a prep-op call 7 days prior to confirm check-in time for procedure. If applicable the patient should contact their pharmacy to verify Rx for procedure prep is ready for pick-up. Patient was advised to call the scheduling department at [phone] if pharmacy states no Rx is available. Patient was advised to call the endoscopy scheduling department if any questions or concerns arise.      [facility] Scheduling Department

## 2024-12-21 NOTE — PLAN OF CARE
Referral for procedure from PAT appointment      Spoke to Kimberly to schedule procedure(s) Colonoscopy       Physician to perform procedure(s) Dr. TACHO Joaquin  Date of Procedure (s) 1/8/25  Arrival Time 2:10 PM  Time of Procedure(s) 3:10 PM   Location of Procedure(s) [place] 4th Floor  Type of Rx Prep sent to patient: PEG  Instructions provided to patient via Postal Mail    Patient was informed on the following information and verbalized understanding. Screening questionnaire reviewed with patient and complete. If procedure requires anesthesia, a responsible adult needs to be present to accompany the patient home, patient cannot drive after receiving anesthesia. Appointment details are tentative, especially check-in time. Patient will receive a prep-op call 7 days prior to confirm check-in time for procedure. If applicable the patient should contact their pharmacy to verify Rx for procedure prep is ready for pick-up. Patient was advised to call the scheduling department at [phone] if pharmacy states no Rx is available. Patient was advised to call the endoscopy scheduling department if any questions or concerns arise.      [facility] Scheduling Department

## 2024-12-21 NOTE — TELEPHONE ENCOUNTER
Are you ready for your Colonoscopy?      __ If you take blood thinners,weight loss or diabetic injectable medications, have you stopped taking them according to your doctor's instructions before your procedures?  __ Have you stopped eating solid foods and followed a clear liquid diet a full day before your procedure or followed the diet       guidelines indicated in your instructions?       REMINDER: NO BROTH AFTER MIDNIGHT THE DAY BEFORE PROCEDURE.   __ Have you completed all your prep solution? PLEASE DO NOT FOLLOW the insert/or box instructions from pharmacy)  __ Have you taken your blood pressure, heart, seizure, or other essential medications the morning of your procedure?  __ Have you planned for a ride to and from procedure?  (Medical Transportation, Uber, Lyft, Taxi, etc. may ONLY be used if a responsible adult is present to accompany you home). The responsible adult CANNOT be the  of the service.  person must be available to return and pick you up within 15 minutes of being notified of discharge.           Questions or Concerns?  Please call us!  [phone] (M-F) [phone] (Nights and weekends)    Listed below are some helpful tips:    Please bring protective cases for eyewear and hearing aids-wear comfortable clothing/shoes.  Follow prep instructions closely so you don't have to do it twice.  Bowel prep is prescription used to clean out the colon before a colonoscopy. The prep increases movement of your colon by causing you to have diarrhea (loose stools). Cleaning out stool from the colon helps your doctor to see in your colon clearly during this procedure.   It is important to stay hydrated before, during and after bowel prep to prevent loss of fluid (dehydration). You can have water and your choice of clear liquids. Reminder: these liquids you will drink in addition to the bowel prep.     Preparing the mixture:    First, mix the prep with water.  Make the taste better by adding a sugar  free drink mix (Crystal Light) can improve the taste of your prep.   Use a large bore (opening) straw. Place towards the back of mouth (throat) as tolerated.  Prepare a prep mixture that is lightly chilled, but not ice-cold. Drinking a large amount of ice-cold liquid can make you feel very ill.  Avoid drinking any RED beverages or eating popsicles with this color for the 24 hours leading up to your procedure. This color can look like blood in the colon.    Consuming the prep:    Take your time. If you feel ill, take a 15-minute break from drinking the prep mixture  Combat hunger and dehydration with clear liquids. Options like JELL-O, or popsicles will help.  Settle in with good reading material. The goal is to clean out 6 feet of colon, so you can plan on spending a good deal of time in the bathroom. Have some good reading material on-hand or an iPad ready to keep yourself entertained!  Keep yourself comfortable. We recommend applying personal hygiene wipes, Tucks pads and a soothing ointment, like A&D ointment, Desitin, or Vaseline to your bottom before starting and as needed to protect your skin.          IMPORTANT INFORMATION TO KNOW BEFORE YOUR PROCEDURE    [facility] 4th Floor         If your procedure requires the administration of anesthesia, it is necessary for a responsible adult to drive you home. (Medical Transportation, Uber, Lyft, Taxi, etc. may ONLY be used if a responsible adult is present to accompany you home.  The responsible adult CAN'T be the  of the service).      person must be available to return to pick you up within 15 minutes of being notified of discharge.       Please bring a picture ID, insurance card, & copayment      Take Medications as directed below:    If you are taking any injectable medication (s) for weight loss and/or diabetes  , hold , please stop taking }on . After the procedure, your provider will inform you  of when to resume  injection.    If you are taking the oral medication Adipex (Phentermine), hold 4 days prior to your procedure, please stop taking on .       If you are taking the oral medication(s):   Invokana (Canagliflozin), Farxiga (Dapagliflozin), Jardiance (Empagliflozin), Invokamet/Invokamet XR (invokana +metformin), Xigduo (farxiga + metformin), Synjardy XR (jardiance + metformin), hold 3 days prior to your procedure, please stop taking on .     1) Stop taking   (prior to the procedure) on:       2) Stop taking   (prior to the procedure) on:      If you begin taking any blood thinning medications, injectable weight loss/diabetes medications (other than insulin) , or Adipex (Phentermine) please contact the endoscopy scheduling department listed below as soon as possible.    If you are diabetic see the attached instruction sheet regarding your medication.     If you take HEART, BLOOD PRESSURE, SEIZURE, PAIN, LUNG (including inhalers/nebulizers), ANTI-REJECTION (transplant patients), or PSYCHIATRIC medications, please take at your regular times with a sip of water or as directed by the scheduling nurse.     Important contact information:    Endoscopy [phone] Hours of operation Monday-Friday 8:00-4:30pm.    Questions about insurance or financial obligations call [phone] or [phone].    If you have questions regarding the prep or need to reschedule, please call [phone]. After hours questions requiring immediate assistance, contact [facility] nurse line at [phone] or [phone].   NOTE:     On occasion, unforeseen circumstances may cause a delay in your procedure start time. We respect your time and appreciate your patience during these circumstances.      Comments:                Colonoscopy Procedure Prep Instructions    Date of procedure: 1/8/25 Arrive at: 2:10 PM    Location of Department:   [facility] [address] [place] [address]  Take the [address]  Elevators to 4th Floor Endoscopy Lab    As soon as possible:   your prep from pharmacy and over the counter DULCOLAX LAXATIVE TABLETS            On the day before your procedure   What You CAN do:   You may have clear liquids ONLY-see below for list.     Liquids That Are OK to Drink:   Water  Sports drinks (Gatorade, Power-Aid)  Coffee or tea (no cream or nondairy creamer)  Clear juices without pulp (apple, white grape)  Gelatin desserts (no fruit or toppings)  Clear soda (sprite, coke, ginger ale)  Chicken broth (until 12 midnight the night before procedure)    What You CANNOT do:   Do not EAT solid food, drink milk or anything   colored red.  Do not drink alcohol.  Do not take oral medications within 1 hour of starting   each dose of prep.  No gum chewing or candy morning of procedure                       Note:   (Please disregard the insert instructions from pharmacy).  PEG Bowel Prep is indicated for cleansing of the colon as a preparation for colonoscopy in adults.   Be sure to tell your doctor about all the medicines you take, including prescription and non-prescription medicines, vitamins, and herbal supplements. PEG Bowel Prep may affect how other medicines work.  Medication taken by mouth may not be absorbed properly when taken within 1 hour before the start of each dose of PEG Bowel Prep.    It is not uncommon to experience some abdominal cramping, nausea and/or vomiting when taking the prep. If you have nausea and/or vomiting while taking the prep, stop drinking for 20 to 30 minutes then continue.      How to take prep:    PEG Bowel Prep is a (2-day) prep.    One (1) bottle of prep are required for a complete preparation for colonoscopy. Dilute the solution concentrate as directed prior to use. You must drink water with each dose of prep, and additional water after each dose.    DOSE 1--Day Before Colonoscopy 1/7/25     Drink at least 6 to 8 glasses of clear liquids from time you wake up until  you begin your prep and then continue until bedtime to avoid dehydration.     12:00 pm (NOON) Mix your entire container of prep with lukewarm water and refrigerate. Take four (4) Dulcolax (Bisacodyl) tablets with at least 8 ounces or more of clear liquids.       6:00 pm:    You must complete Steps 1 and 2 below before going to bed:    Step 1-Drink half the liquid in the container within one (1) hour.   Step 2-Refrigerate the remaining half of the liquid for dose 2. See below when to begin this step.                       IMPORTANT: If you experience preparation-related symptoms (for example, nausea, bloating, or cramping), stop, or slow the rate of drinking the additional water until your symptoms decrease.    DOSE 2--Day of the Colonoscopy 1/8/25 at 2-3 AM.    For this dose, repeat Step 1 shown above using the remaining half of the liquid prep.   You may continue drinking water/clear liquids until   2 hours before your colonoscopy or as directed by the scheduling nurse  1:10 PM.      For information about your procedure, two (2) things to view prior to colonoscopy:  Please watch this informational video. It is important to watch this animated consent video prior to your arrival. If you haven't watched the video prior to arriving, you are required to watch it during admission which can causes delays.    Options for viewing:   Using a keyboard:  press and hold the control tab (Ctrl) and left mouse click to follow links.           Colonoscopy Instructional Video                                                                                   OR    Type link address into your web browser's address bar:  [email or web address]      Educational Booklet with pictures:      Colonoscopy Prep - Liquid      Comments:

## 2024-12-23 ENCOUNTER — HOSPITAL ENCOUNTER (OUTPATIENT)
Dept: RADIOLOGY | Facility: OTHER | Age: 69
Discharge: HOME | End: 2024-12-23
Attending: INTERNAL MEDICINE
Payer: MEDICARE

## 2024-12-23 ENCOUNTER — HOSPITAL ENCOUNTER (OUTPATIENT)
Dept: CARDIOLOGY | Facility: OTHER | Age: 69
Discharge: HOME | End: 2024-12-23
Attending: INTERNAL MEDICINE
Payer: MEDICARE

## 2024-12-23 DIAGNOSIS — Z12.31 ENCOUNTER FOR SCREENING MAMMOGRAM FOR MALIGNANT NEOPLASM OF BREAST: ICD-10-CM

## 2024-12-23 DIAGNOSIS — E55.9 VITAMIN D DEFICIENCY: ICD-10-CM

## 2024-12-23 DIAGNOSIS — Z00.01 ENCOUNTER FOR PREVENTATIVE ADULT HEALTH CARE EXAM WITH ABNORMAL FINDINGS: ICD-10-CM

## 2024-12-23 DIAGNOSIS — I10 HYPERTENSION, UNSPECIFIED TYPE: ICD-10-CM

## 2024-12-23 DIAGNOSIS — M81.0 OSTEOPOROSIS, UNSPECIFIED OSTEOPOROSIS TYPE, UNSPECIFIED PATHOLOGICAL FRACTURE PRESENCE: ICD-10-CM

## 2024-12-23 LAB
OHS QRS DURATION: 86 MS
OHS QTC CALCULATION: 433 MS

## 2024-12-23 PROCEDURE — 93010 ELECTROCARDIOGRAM REPORT: CPT | Mod: ,,, | Performed by: INTERNAL MEDICINE

## 2024-12-23 PROCEDURE — 93005 ELECTROCARDIOGRAM TRACING: CPT

## 2024-12-23 PROCEDURE — 77080 DXA BONE DENSITY AXIAL: CPT | Mod: 26,,, | Performed by: RADIOLOGY

## 2024-12-23 PROCEDURE — 77067 SCR MAMMO BI INCL CAD: CPT | Mod: 26,,, | Performed by: RADIOLOGY

## 2024-12-23 PROCEDURE — 77063 BREAST TOMOSYNTHESIS BI: CPT | Mod: TC

## 2024-12-23 PROCEDURE — 77063 BREAST TOMOSYNTHESIS BI: CPT | Mod: 26,,, | Performed by: RADIOLOGY

## 2024-12-23 PROCEDURE — 77080 DXA BONE DENSITY AXIAL: CPT | Mod: TC

## 2024-12-24 ENCOUNTER — TELEPHONE (OUTPATIENT)
Dept: INTERNAL MEDICINE | Facility: CLINIC | Age: 69
End: 2024-12-24
Payer: MEDICARE

## 2024-12-24 NOTE — TELEPHONE ENCOUNTER
----- Message from Sabas Lane MD sent at 12/24/2024 11:12 AM CST -----  Ms. Tomas.   your DEXA bone test results today suggests the osteopenia which is precursor of the osteoporosis. Please take vitamin D 800 to 1000 units daily and calcium 1000 mg daily, and do weight bearing exercises. Please call us if you have any further questions and contact me if you have any additional concerns. I thank you very much for you to give me opportunity of taking care of your health.        Respectively,      Sabas Lane MD

## 2024-12-24 NOTE — TELEPHONE ENCOUNTER
Message left for patient to return my call.      ----- Message from Sabas Lane MD sent at 12/24/2024 11:12 AM CST -----  Ms. Tomas.   your DEXA bone test results today suggests the osteopenia which is precursor of the osteoporosis. Please take vitamin D 800 to 1000 units daily and calcium 1000 mg daily, and do weight bearing exercises. Please call us if you have any further questions and contact me if you have any additional concerns. I thank you very much for you to give me opportunity of taking care of your health.        Respectively,      Sabas Lane MD

## 2024-12-24 NOTE — TELEPHONE ENCOUNTER
----- Message from Katie sent at 12/24/2024 11:58 AM CST -----  Regarding: missed call  Type:  Patient Returning Call    Who Called:pt  Who Left Message for Patient:Maria De Jesus  Does the patient know what this is regarding?:yes  Would the patient rather a call back or a response via [employer]? call  Best Call Back Number: [phone]  Additional Information:

## 2024-12-24 NOTE — TELEPHONE ENCOUNTER
Spoke with patient stating message below:      December 24, 2024  Dom WHITING    12/24/24 11:28 AM  Note  Message left for patient to return my call.        ----- Message from Sabas Lane MD sent at 12/24/2024 11:12 AM CST -----  Ms. Tomas.   your DEXA bone test results today suggests the osteopenia which is precursor of the osteoporosis. Please take vitamin D 800 to 1000 units daily and calcium 1000 mg daily, and do weight bearing exercises. Please call us if you have any further questions and contact me if you have any additional concerns. I thank you very much for you to give me opportunity of taking care of your health.        Respectively,      Sabas Lane MD

## 2024-12-27 ENCOUNTER — ANCILLARY ORDERS (OUTPATIENT)
Dept: RADIOLOGY | Facility: OTHER | Age: 69
End: 2024-12-27
Payer: MEDICARE

## 2024-12-27 ENCOUNTER — TELEPHONE (OUTPATIENT)
Dept: INTERNAL MEDICINE | Facility: CLINIC | Age: 69
End: 2024-12-27

## 2024-12-27 DIAGNOSIS — R92.8 OTHER ABNORMAL AND INCONCLUSIVE FINDINGS ON DIAGNOSTIC IMAGING OF BREAST: ICD-10-CM

## 2024-12-27 DIAGNOSIS — N63.0 BREAST MASS IN FEMALE: Primary | ICD-10-CM

## 2024-12-27 DIAGNOSIS — R92.8 ABNORMAL MAMMOGRAM: Primary | ICD-10-CM

## 2024-12-27 NOTE — TELEPHONE ENCOUNTER
----- Message from Sabas Lane MD sent at 12/27/2024  2:33 PM CST -----  Please call the patient to have the diagnostic mammography and breast Ultrasound studies for breast mass evaluation.The tests have been ordered. Thanks so much!    Sabas Dewitt MD

## 2024-12-30 ENCOUNTER — TELEPHONE (OUTPATIENT)
Dept: RADIOLOGY | Facility: OTHER | Age: 69
End: 2024-12-30
Payer: MEDICARE

## 2025-01-03 ENCOUNTER — TELEPHONE (OUTPATIENT)
Dept: ENDOSCOPY | Facility: HOSPITAL | Age: 70
End: 2025-01-03
Payer: MEDICARE

## 2025-01-03 NOTE — TELEPHONE ENCOUNTER
Colonoscopy instructions emailed to patient [email or web address]:      Instructions for Colonoscopy  PEG (polyethylene glycol) - Common Brands: Golytely, Colyte, Nulytely, Gavilyte, Trilyte    Date of procedure: 1/8/25 - Arrive at 2:10 PM    Location of Department:   [facility] [address] [place] [address]  Take the [facility] Elevators to 4th Floor Endoscopy Lab    As soon as possible:   your prep from pharmacy and over the counter DULCOLAX LAXATIVE TABLETS (Bisacodyl 5 mg oral tab)     On the ENTIRE day before your procedure:  You may ONLY have clear liquids, such as:  Water  Sports drinks (Gatorade, Power-Aid)  Coffee or tea (no cream or nondairy creamer)  Clear juices without pulp (apple, white grape)  Gelatin desserts (no fruit or toppings)  Clear soda (Sprite, Coke, ginger ale)  Chicken broth (until 12 midnight the night before procedure)    What You CANNOT do:   Do not EAT solid food.  Do not drink milk or anything colored red.  Do not drink alcohol.  Do not take oral medications within 1 hour of starting each dose of prep.  No gum chewing or candy morning of procedure.                       Note:   Disregard the insert instructions from pharmacy.  Medication taken by mouth may not be absorbed properly when taken within 1 hour before the start of each dose of PEG Bowel Prep.  It is not uncommon to experience some abdominal cramping, nausea and/or vomiting when taking the prep. If you have nausea and/or vomiting while taking the prep, stop drinking for 20 to 30 minutes then continue.    How to take prep:    PEG Bowel Prep is a (2-day) prep.    One (1) bottle of prep is required for a complete preparation for colonoscopy. You must drink water with each dose of prep, and additional water after each dose.    DOSE 1--Day Before Colonoscopy 1/7/25     Drink at least 6 to 8 glasses of clear liquids from time you wake up until you begin your prep, and then continue until bedtime to  avoid dehydration.     12:00 pm (NOON) Mix your entire container of prep with lukewarm water and refrigerate. Take four (4) Dulcolax (Bisacodyl) tablets with at least 8 ounces or more of clear liquids.       6:00 pm:  You must complete Steps 1 and 2 below before going to bed:    Step 1- Drink half of the liquid in the container within one (1) hour.   Step 2- Refrigerate the remaining half of the liquid for dose 2.                       DOSE 2--Day of the Colonoscopy 1/8/25 at 2-3 AM    For this dose, repeat Step 1 shown above using the remaining half of the liquid prep.   You may continue drinking water/clear liquids until (1:10 PM).    For more information about your procedure, please note the two items below:    Please watch this informational video. It is important to watch this animated consent video prior to your arrival. If you haven't watched the video prior to arriving, you will be asked to watch it during admission, which can cause delays.     Options for viewing:  Using a keyboard:  press and hold the control tab (Ctrl) and left mouse click to follow link Colonoscopy Instructional Video                                                        OR    Type link address into your web browser's address bar:  [email or web address]    2. Educational Booklet Colonoscopy Prep - Liquid        IMPORTANT INFORMATION TO KNOW BEFORE YOUR PROCEDURE  [facility] 4th Floor    If your procedure requires the administration of anesthesia, it is necessary for a responsible adult to drive you home. (Medical Transportation, Uber, Lyft, Taxi, etc. may ONLY be used if a responsible adult is present to accompany you home.  The responsible adult CANNOT be the  of the service).   person must be available to return to pick you up within 15 minutes of being notified of discharge.    Please bring a picture ID, insurance card, & copayment    Take Medications as directed below:    If  you begin taking any new blood thinning medications, injectable weight loss/diabetes medications (other than insulin), or Adipex (phentermine) please contact the endoscopy scheduling department as soon as possible. [phone]    If you are diabetic, see the attached instructions sheet regarding your medication(s).    If you take HEART, BLOOD PRESSURE, SEIZURE, PAIN, LUNG (including inhalers/nebulizers), ANTI-REJECTION (transplant patients) OR PSYCHIATRIC medications, please take at your regular times with a sip of water, unless otherwise directed by the scheduling nurse.    Important contact information:    Endoscopy Scheduling [phone] / Hours of operation Monday-Friday 8:00 AM-4:30 PM    Questions about insurance or financial obligations call [phone] or [phone]    After hours questions requiring immediate assistance, contact [facility] Line at [phone] or [phone]    Cancellations: Please call [phone] to cancel/reschedule if the need arises. We understand that unpredictable situations may occur that cause you to need to reschedule, but we ask that you let us know as far ahead of time as possible since there is a high demand for appointments.    NOTE - On occasion, unforeseen circumstances may cause a delay in your procedure start time. We respect your time and appreciate your patience during these circumstances.

## 2025-01-08 ENCOUNTER — ANESTHESIA (OUTPATIENT)
Dept: ENDOSCOPY | Facility: HOSPITAL | Age: 70
End: 2025-01-08
Payer: MEDICARE

## 2025-01-08 ENCOUNTER — HOSPITAL ENCOUNTER (OUTPATIENT)
Facility: HOSPITAL | Age: 70
Discharge: HOME | End: 2025-01-08
Attending: SURGERY | Admitting: SURGERY
Payer: MEDICARE

## 2025-01-08 ENCOUNTER — ANESTHESIA EVENT (OUTPATIENT)
Dept: ENDOSCOPY | Facility: HOSPITAL | Age: 70
End: 2025-01-08
Payer: MEDICARE

## 2025-01-08 ENCOUNTER — TELEPHONE (OUTPATIENT)
Dept: ENDOSCOPY | Facility: HOSPITAL | Age: 70
End: 2025-01-08
Payer: MEDICARE

## 2025-01-08 VITALS
OXYGEN SATURATION: 100 % | HEIGHT: 66 IN | RESPIRATION RATE: 18 BRPM | BODY MASS INDEX: 29.73 KG/M2 | WEIGHT: 185 LBS | TEMPERATURE: 98 F | DIASTOLIC BLOOD PRESSURE: 66 MMHG | SYSTOLIC BLOOD PRESSURE: 157 MMHG | HEART RATE: 50 BPM

## 2025-01-08 DIAGNOSIS — Z12.11 ENCOUNTER FOR SCREENING COLONOSCOPY: Primary | ICD-10-CM

## 2025-01-08 DIAGNOSIS — Z86.0100 HISTORY OF COLON POLYPS: Primary | ICD-10-CM

## 2025-01-08 DIAGNOSIS — K63.5 POLYP OF COLON, UNSPECIFIED PART OF COLON, UNSPECIFIED TYPE: ICD-10-CM

## 2025-01-08 DIAGNOSIS — Z12.11 SCREENING FOR COLON CANCER: ICD-10-CM

## 2025-01-08 PROCEDURE — 37000009 HC ANESTHESIA EA ADD 15 MINS: Performed by: SURGERY

## 2025-01-08 PROCEDURE — 37000008 HC ANESTHESIA 1ST 15 MINUTES: Performed by: SURGERY

## 2025-01-08 PROCEDURE — G0105 COLORECTAL SCRN; HI RISK IND: HCPCS | Mod: 53,,, | Performed by: SURGERY

## 2025-01-08 PROCEDURE — 63600175 PHARM REV CODE 636 W HCPCS: Performed by: NURSE ANESTHETIST, CERTIFIED REGISTERED

## 2025-01-08 PROCEDURE — G0105 COLORECTAL SCRN; HI RISK IND: HCPCS | Mod: 74 | Performed by: SURGERY

## 2025-01-08 RX ORDER — LIDOCAINE HYDROCHLORIDE 20 MG/ML
INJECTION, SOLUTION EPIDURAL; INFILTRATION; INTRACAUDAL; PERINEURAL
Status: DISCONTINUED | OUTPATIENT
Start: 2025-01-08 | End: 2025-01-08

## 2025-01-08 RX ORDER — SODIUM CHLORIDE 9 MG/ML
INJECTION, SOLUTION INTRAVENOUS CONTINUOUS
Status: DISCONTINUED | OUTPATIENT
Start: 2025-01-08 | End: 2025-01-08 | Stop reason: HOSPADM

## 2025-01-08 RX ORDER — PROPOFOL 10 MG/ML
VIAL (ML) INTRAVENOUS
Status: DISCONTINUED | OUTPATIENT
Start: 2025-01-08 | End: 2025-01-08

## 2025-01-08 RX ORDER — POLYETHYLENE GLYCOL 3350, SODIUM SULFATE ANHYDROUS, SODIUM BICARBONATE, SODIUM CHLORIDE, POTASSIUM CHLORIDE 236; 22.74; 6.74; 5.86; 2.97 G/4L; G/4L; G/4L; G/4L; G/4L
4 POWDER, FOR SOLUTION ORAL ONCE
Qty: 4000 ML | Refills: 0 | Status: SHIPPED | OUTPATIENT
Start: 2025-01-08 | End: 2025-01-08

## 2025-01-08 RX ORDER — PROPOFOL 10 MG/ML
INJECTION, EMULSION INTRAVENOUS CONTINUOUS PRN
Status: DISCONTINUED | OUTPATIENT
Start: 2025-01-08 | End: 2025-01-08

## 2025-01-08 RX ADMIN — PROPOFOL 80 MG: 10 INJECTION, EMULSION INTRAVENOUS at 01:01

## 2025-01-08 RX ADMIN — LIDOCAINE HYDROCHLORIDE 100 MG: 20 INJECTION, SOLUTION EPIDURAL; INFILTRATION; INTRACAUDAL; PERINEURAL at 01:01

## 2025-01-08 RX ADMIN — PROPOFOL 150 MCG/KG/MIN: 10 INJECTION, EMULSION INTRAVENOUS at 01:01

## 2025-01-08 NOTE — ANESTHESIA POSTPROCEDURE EVALUATION
Anesthesia Post Evaluation    Patient: Kibmerly Tomas    Procedure(s) Performed: Procedure(s) (LRB):  COLONOSCOPY, SCREENING, HIGH RISK PATIENT (N/A)    Final Anesthesia Type: general      Patient location during evaluation: GI PACU  Patient participation: Yes- Able to Participate  Level of consciousness: awake and alert  Post-procedure vital signs: reviewed and stable  Pain management: adequate  Airway patency: patent    PONV status at discharge: No PONV  Anesthetic complications: no      Cardiovascular status: blood pressure returned to baseline  Respiratory status: unassisted  Hydration status: euvolemic  Follow-up not needed.          Vitals Value Taken Time   /66 01/08/25 1345   Temp 36.5 °C (97.7 °F) 01/08/25 1315   Pulse 50 01/08/25 1345   Resp 18 01/08/25 1345   SpO2 100 % 01/08/25 1345         Event Time   Out of Recovery 13:49:10         Pain/Ct Score: Ct Score: 10 (1/8/2025  1:45 PM)

## 2025-01-08 NOTE — TELEPHONE ENCOUNTER
Referral for procedure from PAT appointment originally-R/S due to poor prep      Spoke to patient to schedule procedure(s) Colonoscopy       Physician to perform procedure(s) Dr. LEONIE Garcia  Date of Procedure (s) 2/17/25  Arrival Time 7:30 AM  Time of Procedure(s) 8:30 AM   Location of Procedure(s) [address]  Type of Rx Prep sent to patient: PEG  Instructions provided to patient via Copy in hand    Patient was informed on the following information and verbalized understanding. Screening questionnaire reviewed with patient and complete. If procedure requires anesthesia, a responsible adult needs to be present to accompany the patient home, patient cannot drive after receiving anesthesia. Appointment details are tentative, especially check-in time. Patient will receive a prep-op call 7 days prior to confirm check-in time for procedure. If applicable the patient should contact their pharmacy to verify Rx for procedure prep is ready for pick-up. Patient was advised to call the scheduling department at [phone] if pharmacy states no Rx is available. Patient was advised to call the endoscopy scheduling department if any questions or concerns arise.       Endoscopy Scheduling Department                 Colonoscopy Procedure Prep Instructions    Date of procedure: 2/17/25 Arrive at: 7:30 AM    Location of Department:   [facility] [address] [place] [address]  Take the At[facility] Elevators to 4th Floor Endoscopy Lab    As soon as possible:   your prep from pharmacy and over the counter DULCOLAX LAXATIVE TABLETS            On the day before your procedure   What You CAN do:   You may have clear liquids ONLY-see below for list.     Liquids That Are OK to Drink:   Water  Sports drinks (Gatorade, Power-Aid)  Coffee or tea (no cream or nondairy creamer)  Clear juices without pulp (apple, white grape)  Gelatin desserts (no fruit or toppings)  Clear soda (sprite, coke, alfie  ascencion)  Chicken broth (until 12 midnight the night before procedure)    What You CANNOT do:   Do not EAT solid food, drink milk or anything   colored red.  Do not drink alcohol.  Do not take oral medications within 1 hour of starting   each dose of prep.  No gum chewing or candy morning of procedure                       Note:   (Please disregard the insert instructions from pharmacy).  PEG Bowel Prep is indicated for cleansing of the colon as a preparation for colonoscopy in adults.   Be sure to tell your doctor about all the medicines you take, including prescription and non-prescription medicines, vitamins, and herbal supplements. PEG Bowel Prep may affect how other medicines work.  Medication taken by mouth may not be absorbed properly when taken within 1 hour before the start of each dose of PEG Bowel Prep.    It is not uncommon to experience some abdominal cramping, nausea and/or vomiting when taking the prep. If you have nausea and/or vomiting while taking the prep, stop drinking for 20 to 30 minutes then continue.      How to take prep:    PEG Bowel Prep is a (2-day) prep.    One (1) bottle of prep are required for a complete preparation for colonoscopy. Dilute the solution concentrate as directed prior to use. You must drink water with each dose of prep, and additional water after each dose.    DOSE 1--Day Before Colonoscopy 2/16/25     Drink at least 6 to 8 glasses of clear liquids from time you wake up until you begin your prep and then continue until bedtime to avoid dehydration.     12:00 pm (NOON) Mix your entire container of prep with lukewarm water and refrigerate. Take four (4) Dulcolax (Bisacodyl) tablets with at least 8 ounces or more of clear liquids.       6:00 pm:    You must complete Steps 1 and 2 below before going to bed:    Step 1-Drink half the liquid in the container within one (1) hour.   Step 2-Refrigerate the remaining half of the liquid for dose 2. See below when to begin this step.                        IMPORTANT: If you experience preparation-related symptoms (for example, nausea, bloating, or cramping), stop, or slow the rate of drinking the additional water until your symptoms decrease.    DOSE 2--Day of the Colonoscopy 2/17/25 at 2-3 AM.    For this dose, repeat Step 1 shown above using the remaining half of the liquid prep.   You may continue drinking water/clear liquids until   2 hours before your colonoscopy or as directed by the scheduling nurse  6:00 AM.      For information about your procedure, two (2) things to view prior to colonoscopy:  Please watch this informational video. It is important to watch this animated consent video prior to your arrival. If you haven't watched the video prior to arriving, you are required to watch it during admission which can causes delays.    Options for viewing:   Using a keyboard:  press and hold the control tab (Ctrl) and left mouse click to follow links.           Colonoscopy Instructional Video                                                                                   OR    Type link address into your web browser's address bar:  [email or web address]      Educational Booklet with pictures:      Colonoscopy Prep - Liquid      Comments:               IMPORTANT INFORMATION TO KNOW BEFORE YOUR PROCEDURE    [facility] [place] 4th Floor         If your procedure requires the administration of anesthesia, it is necessary for a responsible adult to drive you home. (Medical Transportation, Uber, Lyft, Taxi, etc. may ONLY be used if a responsible adult is present to accompany you home.  The responsible adult CAN'T be the  of the service).      person must be available to return to pick you up within 15 minutes of being notified of discharge.       Please bring a picture ID, insurance card, & copayment      Take Medications as directed below:      If you begin taking any blood thinning medications,  injectable weight loss/diabetes medications (other than insulin) , or Adipex (Phentermine) please contact the endoscopy scheduling department listed below as soon as possible.    If you are diabetic see the attached instruction sheet regarding your medication.     If you take HEART, BLOOD PRESSURE, SEIZURE, PAIN, LUNG (including inhalers/nebulizers), ANTI-REJECTION (transplant patients), or PSYCHIATRIC medications, please take at your regular times with a sip of water or as directed by the scheduling nurse.     Important contact information:    Endoscopy [phone] Hours of operation Monday-Friday 8:00-4:30pm.    Questions about insurance or financial obligations call [phone] or [phone].    If you have questions regarding the prep or need to reschedule, please call [phone]. After hours questions requiring immediate assistance, contact [facility] nurse line at [phone] or [phone].   NOTE:     On occasion, unforeseen circumstances may cause a delay in your procedure start time. We respect your time and appreciate your patience during these circumstances.      Comments:

## 2025-01-08 NOTE — H&P
COLONOSCOPY HISTORY AND PHYSICAL EXAM    Procedure : Colonoscopy      INDICATIONS: personal history of colon polyps    Family Hx of CRC: none    Last Colonoscopy:  7 years ago   Findings: polyps        Past Medical History:   Diagnosis Date    Hyperlipidemia     Hypertension     Seizures      Sedation Problems: NO  Family History   Problem Relation Name Age of Onset    Hypertension Mother       Fam Hx of Sedation Problems: NO  Social History     Socioeconomic History    Marital status: Single   Tobacco Use    Smoking status: Never    Smokeless tobacco: Never   Substance and Sexual Activity    Alcohol use: Yes     Comment: Occ    Drug use: No    Sexual activity: Not Currently     Social Drivers of Health     Financial Resource Strain: Unknown (10/17/2022)    Received from [email or web address] [facility] [employer]    Overall Financial Resource Strain (CARDIA)     Difficulty of Paying Living Expenses: Patient declined   Food Insecurity: Unknown (10/17/2022)    Received from [employer] [facility] [employer]    Hunger Vital Sign     Worried About Running Out of Food in the Last Year: Patient declined     Ran Out of Food in the Last Year: Patient declined   Transportation Needs: Unknown (10/17/2022)    Received from [employer] [facility] [employer]    PRAPARE - Transportation     Lack of Transportation (Medical): Patient declined     Lack of Transportation (Non-Medical): No   Housing Stability: High Risk (10/17/2022)    Received from [employer] [facility] [employer]    Housing Stability Vital Sign     Unable to Pay for Housing in the Last Year: Patient refused     Unstable Housing in the Last Year: Yes       Review of Systems - Negative except   Respiratory ROS: no dyspnea  Cardiovascular ROS: no exertional chest pain  Gastrointestinal ROS: NO abdominal discomfort,  NO rectal bleeding  Musculoskeletal ROS: no muscular pain  Neurological ROS: no recent stroke    Physical Exam:  BP (!) 156/65 (BP Location: Left arm, Patient Position: Lying)   Pulse (!) 55   Temp  "97.7 °F (36.5 °C) (Temporal)   Resp 16   Ht 5' 6" (1.676 m)   Wt 83.9 kg (185 lb)   SpO2 100%   Breastfeeding No   BMI 29.86 kg/m²   General: no distress  Head: normocephalic  Mallampati Score   Neck: supple, symmetrical, trachea midline  Lungs:  clear to auscultation bilaterally and normal respiratory effort  Heart: regular rate and rhythm and no murmur  Abdomen: soft, non-tender non-distented; bowel sounds normal; no masses,  no organomegaly  Extremities: no cyanosis or edema, or clubbing    ASA:  III    PLAN  COLONOSCOPY.    SedationPlan :MAC    The details of the procedure, the possible need for biopsy or polypectomy and the potential risks including bleeding, perforation, missed polyps were discussed in detail.    Dominique Joaquin MD   Staff Surgeon   Colon & Rectal Surgery      "

## 2025-01-08 NOTE — ANESTHESIA PREPROCEDURE EVALUATION
01/08/2025  Kimberly Tomas is a 69 y.o., female.    Past Medical History:   Diagnosis Date    Hyperlipidemia     Hypertension     Seizures      Patient Active Problem List   Diagnosis    Chronic idiopathic constipation    Dyslipidemia    History of stroke    Prediabetes    Seizure    Peripheral vertigo    B12 deficiency    History of colon polyps    Epiretinal membrane, right    Lamellar macular hole of right eye    Peripheral drusen of both eyes    Vitamin D deficiency     Social History     Socioeconomic History    Marital status: Single   Tobacco Use    Smoking status: Never    Smokeless tobacco: Never   Substance and Sexual Activity    Alcohol use: Yes     Comment: Occ    Drug use: No    Sexual activity: Not Currently     Social Drivers of Health     Financial Resource Strain: Unknown (10/17/2022)    Received from [facility] [employer]    Overall Financial Resource Strain (CARDIA)     Difficulty of Paying Living Expenses: Patient declined   Food Insecurity: Unknown (10/17/2022)    Received from [facility]    Hunger Vital Sign     Worried About Running Out of Food in the Last Year: Patient declined     Ran Out of Food in the Last Year: Patient declined   Transportation Needs: Unknown (10/17/2022)    Received from [facility] [employer] [employer]    PRAPARE - Transportation     Lack of Transportation (Medical): Patient declined     Lack of Transportation (Non-Medical): No   Housing Stability: High Risk (10/17/2022)    Received from [facility] [facility]    Housing Stability Vital Sign     Unable to Pay for Housing in the Last Year: Patient refused     Unstable Housing in the Last Year: Yes     No current facility-administered medications on file prior to encounter.     Current Outpatient Medications on File Prior to Encounter   Medication Sig Dispense Refill    aspirin 81 MG Chew Take 1 tablet  by mouth once daily.      lisinopriL (PRINIVIL,ZESTRIL) 20 MG tablet TAKE 1 TABLET BY MOUTH EVERY DAY Oral      VITAMIN B COMPLEX ORAL Take 1 tablet by mouth once daily.      fluticasone propionate (FLONASE) 50 mcg/actuation nasal spray 1 spray (50 mcg total) by Each Nostril route 2 (two) times daily as needed for Rhinitis. 15 g 0    ibuprofen (ADVIL,MOTRIN) 600 MG tablet Take 1 tablet (600 mg total) by mouth every 6 (six) hours as needed for Pain. 20 tablet 0    levETIRAcetam (KEPPRA) 500 MG Tab Take 1 tablet by mouth 2 (two) times daily. (Patient not taking: Reported on 12/16/2024)       Pre-op Assessment    I have reviewed the NPO Status.      Review of Systems  Anesthesia Hx:               Denies Personal Hx of Anesthesia complications.                    Cardiovascular:  Exercise tolerance: poor   Hypertension           hyperlipidemia    Ex tolerance poor 2/2 muscle pain/generalized weakness. Pt denies CP/ SOB. Walks slowly without assistive devices                           Pulmonary:  Pulmonary Normal                       Renal/:  Renal/ Normal                 Hepatic/GI:  Hepatic/GI Normal                    Neurological:   CVA, no residual symptoms    Seizures (last seizure ~3 years ago. Has been off meds for months)    H/o frequent falls                            Endocrine:  Endocrine Normal                Physical Exam    Airway:  Mallampati: III   Neck ROM: Normal ROM        Anesthesia Plan  Type of Anesthesia, risks & benefits discussed:    Anesthesia Type: Gen Natural Airway  Intra-op Monitoring Plan: Standard ASA Monitors  Induction:  IV  Informed Consent: Informed consent signed with the Patient and all parties understand the risks and agree with anesthesia plan.  All questions answered.   ASA Score: 3    Ready For Surgery From Anesthesia Perspective.     .

## 2025-01-08 NOTE — [employer] PATIENT INSTRUCTIONS
Discharge Summary/Instructions after an Endoscopic Procedure  Patient Name: Kimberly Tomas  Patient MRN: [id number]  Patient YOB: 1955 Wednesday, January 8, 2025  Dominique Joaquin MD  Dear patient,  As a result of recent federal legislation (The Federal Cures Act), you may   receive lab or pathology results from your procedure in your [facility]   account before your physician is able to contact you. Your physician or   their representative will relay the results to you with their   recommendations at their soonest availability.  Thank you,  RESTRICTIONS:  During your procedure today, you received medications for sedation.  These   medications may affect your judgment, balance and coordination.  Therefore,   for 24 hours, you have the following restrictions:   - DO NOT drive a car, operate machinery, make legal/financial decisions,   sign important papers or drink alcohol.    ACTIVITY:  Today: no heavy lifting, straining or running due to procedural   sedation/anesthesia.  The following day: return to full activity including work.  DIET:  Eat and drink normally unless instructed otherwise.     TREATMENT FOR COMMON SIDE EFFECTS:  - Mild abdominal pain, nausea, belching, bloating or excessive gas:  rest,   eat lightly and use a heating pad.  - Sore Throat: treat with throat lozenges and/or gargle with warm salt   water.  - Because air was used during the procedure, expelling large amounts of air   from your rectum or belching is normal.  - If a bowel prep was taken, you may not have a bowel movement for 1-3 days.    This is normal.  SYMPTOMS TO WATCH FOR AND REPORT TO YOUR PHYSICIAN:  1. Abdominal pain or bloating, other than gas cramps.  2. Chest pain.  3. Back pain.  4. Signs of infection such as: chills or fever occurring within 24 hours   after the procedure.  5. Rectal bleeding, which would show as bright red, maroon, or black stools.   (A tablespoon of blood from the rectum is not serious, especially if    hemorrhoids are present.)  6. Vomiting.  7. Weakness or dizziness.  GO DIRECTLY TO THE NEAREST EMERGENCY ROOM IF YOU HAVE ANY OF THE FOLLOWING:      Difficulty breathing              Chills and/or fever over 101 F   Persistent vomiting and/or vomiting blood   Severe abdominal pain   Severe chest pain   Black, tarry stools   Bleeding- more than one tablespoon   Any other symptom or condition that you feel may need urgent attention  Your doctor recommends these additional instructions:  If any biopsies were taken, your doctors clinic will contact you in 1 to 2   weeks with any results.  - Patient has a contact number available for emergencies.  The signs and   symptoms of potential delayed complications were discussed with the   patient.  Return to normal activities tomorrow.  Written discharge   instructions were provided to the patient.   - Resume previous diet.   - Continue present medications.   - Discharge patient to home (ambulatory).   - Repeat colonoscopy at the next available appointment because the bowel   preparation was poor.  For questions, problems or results please call your physician - Dominique Joaquin MD at Work:  [phone].  [facility] [place], EMERGENCY ROOM PHONE NUMBER: [phone]  IF A COMPLICATION OR EMERGENCY SITUATION ARISES AND YOU ARE UNABLE TO REACH   YOUR PHYSICIAN - GO DIRECTLY TO THE EMERGENCY ROOM.  MD Dominique Davies MD  1/8/2025 1:12:03 PM  This report has been verified and signed electronically.  Dear patient,  As a result of recent federal legislation (The Federal Cures Act), you may   receive lab or pathology results from your procedure in your [facility]   account before your physician is able to contact you. Your physician or   their representative will relay the results to you with their   recommendations at their soonest availability.  Thank you,  [employer]ATION

## 2025-01-08 NOTE — ANESTHESIA POSTPROCEDURE EVALUATION
Anesthesia Post Evaluation    Patient: Kimberly Tomas    Procedure(s) Performed: Procedure(s) (LRB):  COLONOSCOPY, SCREENING, HIGH RISK PATIENT (N/A)    Final Anesthesia Type: general      Patient location during evaluation: GI PACU  Patient participation: Yes- Able to Participate  Level of consciousness: awake and alert  Post-procedure vital signs: reviewed and stable  Pain management: adequate  Airway patency: patent    PONV status at discharge: No PONV  Anesthetic complications: no      Cardiovascular status: blood pressure returned to baseline  Respiratory status: unassisted  Hydration status: euvolemic  Follow-up not needed.          Vitals Value Taken Time   /66 01/08/25 1345   Temp 36.5 °C (97.7 °F) 01/08/25 1315   Pulse 50 01/08/25 1345   Resp 18 01/08/25 1345   SpO2 100 % 01/08/25 1345         Event Time   Out of Recovery 13:49:10         Pain/Ct Score: Ct Score: 10 (1/8/2025  1:45 PM)

## 2025-01-08 NOTE — TRANSFER OF CARE
Anesthesia Transfer of Care Note    Patient: Kimberly Tomas    Procedure(s) Performed: Procedure(s) (LRB):  COLONOSCOPY, SCREENING, HIGH RISK PATIENT (N/A)    Patient location: GI    Anesthesia Type: general    Transport from OR: Transported from OR on room air with adequate spontaneous ventilation    Post pain: adequate analgesia    Post assessment: no apparent anesthetic complications    Post vital signs: stable    Level of consciousness: responds to stimulation and awake    Nausea/Vomiting: no nausea/vomiting    Complications: none    Transfer of care protocol was followed      Last vitals: Visit Vitals  /60   Pulse 60   Temp 36   Resp 20   Ht    Wt    SpO2 100%   Breastfeeding    BMI

## 2025-01-17 ENCOUNTER — HOSPITAL ENCOUNTER (OUTPATIENT)
Dept: RADIOLOGY | Facility: OTHER | Age: 70
Discharge: HOME | End: 2025-01-17
Attending: INTERNAL MEDICINE
Payer: MEDICARE

## 2025-01-17 ENCOUNTER — TELEPHONE (OUTPATIENT)
Dept: ENDOSCOPY | Facility: HOSPITAL | Age: 70
End: 2025-01-17
Payer: MEDICARE

## 2025-01-17 DIAGNOSIS — R92.8 OTHER ABNORMAL AND INCONCLUSIVE FINDINGS ON DIAGNOSTIC IMAGING OF BREAST: ICD-10-CM

## 2025-01-17 DIAGNOSIS — N63.0 BREAST MASS IN FEMALE: ICD-10-CM

## 2025-01-17 PROCEDURE — 77065 DX MAMMO INCL CAD UNI: CPT | Mod: 26,RT,, | Performed by: RADIOLOGY

## 2025-01-17 PROCEDURE — 77061 BREAST TOMOSYNTHESIS UNI: CPT | Mod: 26,RT,, | Performed by: RADIOLOGY

## 2025-01-17 PROCEDURE — 76642 ULTRASOUND BREAST LIMITED: CPT | Mod: TC,RT

## 2025-01-17 PROCEDURE — 77065 DX MAMMO INCL CAD UNI: CPT | Mod: TC,RT

## 2025-01-17 PROCEDURE — 76642 ULTRASOUND BREAST LIMITED: CPT | Mod: 26,RT,, | Performed by: RADIOLOGY

## 2025-01-17 NOTE — TELEPHONE ENCOUNTER
Called pt. To assist with ordering new prep . Rec. Msg. From Endo Lab. Pt. Did not answer call. Left VM message to call Main scheduling line for new prep orders and updated instructions to be sent.. Pt. Is scheudled on 2/17/25 [facility]

## 2025-05-05 ENCOUNTER — OFFICE VISIT (OUTPATIENT)
Dept: URGENT CARE | Facility: CLINIC | Age: 70
End: 2025-05-05
Payer: MEDICARE

## 2025-05-05 VITALS
OXYGEN SATURATION: 100 % | WEIGHT: 177.13 LBS | HEART RATE: 71 BPM | TEMPERATURE: 98 F | SYSTOLIC BLOOD PRESSURE: 113 MMHG | HEIGHT: 66 IN | BODY MASS INDEX: 28.47 KG/M2 | DIASTOLIC BLOOD PRESSURE: 58 MMHG | RESPIRATION RATE: 16 BRPM

## 2025-05-05 DIAGNOSIS — R05.1 ACUTE COUGH: ICD-10-CM

## 2025-05-05 DIAGNOSIS — J06.9 VIRAL URI: Primary | ICD-10-CM

## 2025-05-05 LAB
CTP QC/QA: YES
SARS CORONAVIRUS 2 ANTIGEN: NEGATIVE

## 2025-05-05 PROCEDURE — 87811 SARS-COV-2 COVID19 W/OPTIC: CPT | Mod: QW,S$GLB,,

## 2025-05-05 PROCEDURE — 99213 OFFICE O/P EST LOW 20 MIN: CPT | Mod: S$GLB,,,

## 2025-05-05 RX ORDER — BENZONATATE 100 MG/1
100 CAPSULE ORAL 3 TIMES DAILY PRN
Qty: 30 CAPSULE | Refills: 0 | Status: SHIPPED | OUTPATIENT
Start: 2025-05-05 | End: 2025-05-15

## 2025-05-05 NOTE — PATIENT INSTRUCTIONS
Negative for COVID. Symptoms are likely viral today.     When sick with a viral illness, cover your mouth and nose when sneezing and coughing. Wash hands frequently. Sanitize areas at home. Wear a mask in public if you need. Stay home if you are having fevers, chills, body aches, fatigue. Symptoms should resolve in 7-14 days. There is no specific treatment for viral illnesses. We treat symptoms with supportive care. Getting plenty of rest but completing light activities daily, eating a well-balanced diet, drinking plenty of fluids, managing stress levels can aid in faster recovery.      Try tessalon perles as directed during the day for your cough. It will help numb the back of your throat so you do not have the urge to cough.      Try a decongestant and corticosteroid nasal spray like flonase for the next few days for sinus relief. Initial: 2 sprays in each nostril once daily for 1 week. Reduce to 1 spray in each nostril once per day. Stop taking if you develop a nose bleed. Nasal saline spray can be used together with flonase to help moisten nostrils. An antihistamine like zyrtec, claritin, allegra can also be helpful for sinus relief and will help dry nasal passages.     Honey is a natural cough suppressant.     -If you do NOT have high blood pressure, you may use a decongestant form (D)  of this medication (ie. Claritin- D, zyrtec-D, allegra-D, Mucinex-D) or if you do not take the D form, you can take sudafed (pseudoephedrine) over the counter, which is a powerful decongestant. Do NOT take two decongestant (D) medications at the same time (such as mucinex-D and claritin-D or plain sudafed and claritin D). Dextromethorphan (DM) is a cough suppressant over the counter (ie. mucinex DM, robitussin, delsym; dayquil/nyquil has DM as well.) Try Afrin for nasal congestion at bedtime. Only use for 3 days as this can cause a rebound of congestion. Try cepacol for sore throat.     If you do have Hypertension or  palpitations, it is safe to take Coricidin HBP (multi-symptom flu) for relief of sinus symptoms.  Try DASH diet to help lower BP and buy a blood pressure cuff for home monitoring. Check blood pressure at least 2 times per day and create a log. Avoid eating foods that are high in salt. Eat more foods with potassium, magnesium and calcium which will help dilate your vessels and decrease your BP.     Please only use over the counter cough and cold medications (decongestants) for 3-5 days at a time to avoid rebound congestion.     Warm tea/warm liquids will help soothe the back of your throat. Warm water salt gurgles can also be helpful. A dry throat will cause pain. Make sure to stay hydrated. Water and pedilyte are the best to drink. Neti pot irrigation, humidifier in your room, avoiding fans, warm compresses to face, eating/drinking hot soups, hot shower before bedtime can help.     The recommended daily fluid intake for women is 2.7 liters (five 16 oz bottles).      Alternate Tylenol and ibuprofen every 4 hours as needed for fever and body aches.  Please take NSAIDs with a full glass of water and food to avoid GI upset.      Getting plenty of rest can aid in a faster recovery of illnesses.     Please follow-up with your primary care provider or return to the clinic if not better/worsening symptoms in 1 week.     Report to the ER if you have chest pain, shortness of breath, palpitations.

## 2025-05-05 NOTE — PROGRESS NOTES
"Subjective:      Patient ID: Kimberly Tomas is a 69 y.o. female.    Vitals:  height is 5' 6" (1.676 m) and weight is 80.3 kg (177 lb 2.2 oz). Her temperature is 98.3 °F (36.8 °C). Her blood pressure is 113/58 (abnormal) and her pulse is 71. Her respiration is 16 and oxygen saturation is 100%.     Chief Complaint: Cough    69 y.o female presents to clinic c.o cough x 1 week. Symptoms have improved. Mucous is clear.     Cough  This is a new problem. The current episode started 1 to 4 weeks ago. The problem has been gradually worsening. Pertinent negatives include no chest pain, chills, ear congestion, ear pain, fever, headaches, heartburn, hemoptysis, myalgias, nasal congestion, postnasal drip, rash, rhinorrhea, sore throat, shortness of breath, sweats, weight loss or wheezing. She has tried OTC cough suppressant for the symptoms. The treatment provided no relief. There is no history of asthma, bronchiectasis, bronchitis, COPD, emphysema, environmental allergies or pneumonia.       Constitution: Negative for chills, fatigue and fever.   HENT:  Negative for ear pain, postnasal drip and sore throat.    Cardiovascular:  Negative for chest pain.   Respiratory:  Positive for cough. Negative for bloody sputum, shortness of breath and wheezing.    Gastrointestinal:  Negative for abdominal pain, nausea, vomiting, diarrhea and heartburn.   Musculoskeletal:  Negative for muscle ache.   Skin:  Negative for rash.   Allergic/Immunologic: Negative for environmental allergies.   Neurological:  Negative for headaches.      Objective:     Physical Exam   Constitutional: She is oriented to person, place, and time. She appears well-developed. She is cooperative.  Non-toxic appearance. She does not appear ill. No distress.   HENT:   Head: Normocephalic and atraumatic.   Ears:   Right Ear: Hearing, tympanic membrane, external ear and ear canal normal.   Left Ear: Hearing, tympanic membrane, external ear and ear canal normal.   Nose: Nose " normal. No mucosal edema, rhinorrhea, nasal deformity or congestion. No epistaxis. Right sinus exhibits no maxillary sinus tenderness and no frontal sinus tenderness. Left sinus exhibits no maxillary sinus tenderness and no frontal sinus tenderness.   Mouth/Throat: Uvula is midline, oropharynx is clear and moist and mucous membranes are normal. No trismus in the jaw. Normal dentition. No uvula swelling. No oropharyngeal exudate, posterior oropharyngeal edema or posterior oropharyngeal erythema.   Eyes: Conjunctivae and lids are normal. No scleral icterus.   Neck: Trachea normal and phonation normal. Neck supple. No edema present. No erythema present. No neck rigidity present.   Cardiovascular: Normal rate, regular rhythm, normal heart sounds and normal pulses.   Pulmonary/Chest: Effort normal and breath sounds normal. No stridor. No respiratory distress. She has no decreased breath sounds. She has no wheezes. She has no rhonchi. She has no rales.   Abdominal: Normal appearance.   Musculoskeletal: Normal range of motion.         General: No deformity. Normal range of motion.   Lymphadenopathy:     She has no cervical adenopathy.   Neurological: She is alert and oriented to person, place, and time. She exhibits normal muscle tone. Coordination normal.   Skin: Skin is warm, dry, intact, not diaphoretic and not pale.   Psychiatric: Her speech is normal and behavior is normal. Judgment and thought content normal.   Nursing note and vitals reviewed.      Assessment:     1. Viral URI    2. Acute cough        Plan:     Results for orders placed or performed in visit on 05/05/25   SARS Coronavirus 2 Antigen, POCT Manual Read    Collection Time: 05/05/25  9:50 AM   Result Value Ref Range    SARS Coronavirus 2 Antigen Negative Negative, Presumptive Negative     Acceptable Yes        Viral URI    Acute cough  -     SARS Coronavirus 2 Antigen, POCT Manual Read  -     benzonatate (TESSALON) 100 MG capsule; Take 1  capsule (100 mg total) by mouth 3 (three) times daily as needed for Cough.  Dispense: 30 capsule; Refill: 0            Discussed results/diagnosis/plan with patient in clinic. Strict precautions given to patient to monitor for worsening signs and symptoms. Advised to follow up with PCP or specialist.  Explained side effects of medications prescribed with patient and informed him/her to discontinue use if he/she has any side effects and to inform UC or PCP if this occurs. All questions answered. Strict ED verses clinic return precautions stressed and given in depth. Advised if symptoms worsens of fail to improve he/she should go to the Emergency Room. Discharge and follow-up instructions given verbally/printed with the patient who expressed understanding and willingness to comply with my recommendations. Patient voiced understanding and in agreement with current treatment plan. Patient exits the exam room in no acute distress. Conversant and engaged during discharge discussion, verbalized understanding.

## 2025-06-26 ENCOUNTER — HOSPITAL ENCOUNTER (EMERGENCY)
Facility: HOSPITAL | Age: 70
Discharge: HOME | End: 2025-06-26
Attending: EMERGENCY MEDICINE
Payer: MEDICARE

## 2025-06-26 VITALS
SYSTOLIC BLOOD PRESSURE: 152 MMHG | WEIGHT: 177 LBS | BODY MASS INDEX: 28.45 KG/M2 | TEMPERATURE: 98 F | DIASTOLIC BLOOD PRESSURE: 78 MMHG | OXYGEN SATURATION: 98 % | HEART RATE: 48 BPM | RESPIRATION RATE: 16 BRPM | HEIGHT: 66 IN

## 2025-06-26 DIAGNOSIS — R07.9 CHEST PAIN: Primary | ICD-10-CM

## 2025-06-26 DIAGNOSIS — R53.1 GENERALIZED WEAKNESS: ICD-10-CM

## 2025-06-26 DIAGNOSIS — R74.8 ELEVATED CPK: ICD-10-CM

## 2025-06-26 LAB
ABSOLUTE EOSINOPHIL (OHS): 0.04 K/UL
ABSOLUTE MONOCYTE (OHS): 0.32 K/UL (ref 0.3–1)
ABSOLUTE NEUTROPHIL COUNT (OHS): 1.19 K/UL (ref 1.8–7.7)
ALBUMIN SERPL BCP-MCNC: 3.9 G/DL (ref 3.5–5.2)
ALP SERPL-CCNC: 63 UNIT/L (ref 40–150)
ALT SERPL W/O P-5'-P-CCNC: 8 UNIT/L (ref 10–44)
ANION GAP (OHS): 9 MMOL/L (ref 8–16)
AST SERPL-CCNC: 17 UNIT/L (ref 11–45)
BASOPHILS # BLD AUTO: 0.03 K/UL
BASOPHILS NFR BLD AUTO: 1 %
BILIRUB SERPL-MCNC: 0.8 MG/DL (ref 0.1–1)
BNP SERPL-MCNC: 26 PG/ML (ref 0–99)
BUN SERPL-MCNC: 6 MG/DL (ref 8–23)
CALCIUM SERPL-MCNC: 9.4 MG/DL (ref 8.7–10.5)
CHLORIDE SERPL-SCNC: 105 MMOL/L (ref 95–110)
CK SERPL-CCNC: 369 U/L (ref 20–180)
CO2 SERPL-SCNC: 24 MMOL/L (ref 23–29)
CREAT SERPL-MCNC: 0.7 MG/DL (ref 0.5–1.4)
ERYTHROCYTE [DISTWIDTH] IN BLOOD BY AUTOMATED COUNT: 13.4 % (ref 11.5–14.5)
GFR SERPLBLD CREATININE-BSD FMLA CKD-EPI: >60 ML/MIN/1.73/M2
GLUCOSE SERPL-MCNC: 89 MG/DL (ref 70–110)
HCT VFR BLD AUTO: 41 % (ref 37–48.5)
HCV AB SERPL QL IA: NORMAL
HGB BLD-MCNC: 13 GM/DL (ref 12–16)
HIV 1+2 AB+HIV1 P24 AG SERPL QL IA: NORMAL
IMM GRANULOCYTES # BLD AUTO: 0.01 K/UL (ref 0–0.04)
IMM GRANULOCYTES NFR BLD AUTO: 0.3 % (ref 0–0.5)
LYMPHOCYTES # BLD AUTO: 1.28 K/UL (ref 1–4.8)
MAGNESIUM SERPL-MCNC: 2 MG/DL (ref 1.6–2.6)
MCH RBC QN AUTO: 28.8 PG (ref 27–31)
MCHC RBC AUTO-ENTMCNC: 31.7 G/DL (ref 32–36)
MCV RBC AUTO: 91 FL (ref 82–98)
NUCLEATED RBC (/100WBC) (OHS): 0 /100 WBC
OHS QRS DURATION: 66 MS
OHS QTC CALCULATION: 434 MS
PHOSPHATE SERPL-MCNC: 3.4 MG/DL (ref 2.7–4.5)
PLATELET # BLD AUTO: 271 K/UL (ref 150–450)
PMV BLD AUTO: 11 FL (ref 9.2–12.9)
POTASSIUM SERPL-SCNC: 4.3 MMOL/L (ref 3.5–5.1)
PROT SERPL-MCNC: 7.3 GM/DL (ref 6–8.4)
RBC # BLD AUTO: 4.52 M/UL (ref 4–5.4)
RELATIVE EOSINOPHIL (OHS): 1.4 %
RELATIVE LYMPHOCYTE (OHS): 44.6 % (ref 18–48)
RELATIVE MONOCYTE (OHS): 11.1 % (ref 4–15)
RELATIVE NEUTROPHIL (OHS): 41.6 % (ref 38–73)
SODIUM SERPL-SCNC: 138 MMOL/L (ref 136–145)
TROPONIN I SERPL HS-MCNC: <3 NG/L
TROPONIN I SERPL HS-MCNC: <3 NG/L
WBC # BLD AUTO: 2.87 K/UL (ref 3.9–12.7)

## 2025-06-26 PROCEDURE — 82550 ASSAY OF CK (CPK): CPT | Performed by: PHYSICIAN ASSISTANT

## 2025-06-26 PROCEDURE — 87389 HIV-1 AG W/HIV-1&-2 AB AG IA: CPT | Performed by: PHYSICIAN ASSISTANT

## 2025-06-26 PROCEDURE — 83880 ASSAY OF NATRIURETIC PEPTIDE: CPT | Performed by: PHYSICIAN ASSISTANT

## 2025-06-26 PROCEDURE — 83735 ASSAY OF MAGNESIUM: CPT | Performed by: PHYSICIAN ASSISTANT

## 2025-06-26 PROCEDURE — 84484 ASSAY OF TROPONIN QUANT: CPT | Performed by: PHYSICIAN ASSISTANT

## 2025-06-26 PROCEDURE — 85025 COMPLETE CBC W/AUTO DIFF WBC: CPT | Performed by: PHYSICIAN ASSISTANT

## 2025-06-26 PROCEDURE — 99285 EMERGENCY DEPT VISIT HI MDM: CPT | Mod: 25

## 2025-06-26 PROCEDURE — 86803 HEPATITIS C AB TEST: CPT | Performed by: PHYSICIAN ASSISTANT

## 2025-06-26 PROCEDURE — 84100 ASSAY OF PHOSPHORUS: CPT | Performed by: PHYSICIAN ASSISTANT

## 2025-06-26 PROCEDURE — 84460 ALANINE AMINO (ALT) (SGPT): CPT | Performed by: PHYSICIAN ASSISTANT

## 2025-06-26 NOTE — DISCHARGE INSTRUCTIONS
I have placed a referral for you to establish care with a primary care and also Cardiology.  It is important that you follow up closely with them.  Your workup today was pretty reassuring.  No signs of injury to your heart.  Make sure you drink plenty of fluids and rest.  Make sure you see someone within the next week.  Return to the emergency department with any worsening symptoms or concerns.  Thank you for allowing us to take care of you today.

## 2025-06-26 NOTE — ED PROVIDER NOTES
Encounter Date: 6/26/2025       History     Chief Complaint   Patient presents with    Chest Pain     Onset last night while laying in bed     Patient is a 69-year-old female with history of hypertension and hyperlipidemia who presents to the emergency department with vague symptoms.  Patient reports for the last couple of months she has had an intermittent sharp pain in her chest.  Reports it typically happens at night.  Denies exertional pain.  Reports she does have exertional shortness of breath.  Reports she feels very weak after walking for a long period of time.  Reports muscle aches over the last 24 hours.  Reports generalized weakness and fatigue.  Denies nausea or vomiting.  Reports she is eating and drinking normally.  Denies URI symptoms.  Denies lower extremity swelling.  Denies recent travel, recent surgery, previous blood clot, or exogenous estrogen.  Reports she does not see your primary care doctor regularly.  Reports she would like to have a full workup today to get a clean bill of health.  Reports she does live alone.  Reports her son does visit in between working as a .  Reports her other family lives in [place].  Reports at times she does feel lonely but she is safe at home.  Reports she has been diagnosed with anxiety in the past but does not take any medication for it.  Reports at times she does feel very anxious.  Denies any significant depression, suicidal or homicidal ideations.    The history is provided by the patient.     Review of patient's allergies indicates:  No Known Allergies  Past Medical History:   Diagnosis Date    Hyperlipidemia     Hypertension     Seizures      Past Surgical History:   Procedure Laterality Date    COLONOSCOPY, SCREENING, HIGH RISK PATIENT N/A 1/8/2025    Procedure: COLONOSCOPY, SCREENING, HIGH RISK PATIENT;  Surgeon: Dominique Joaquin MD;  Location: [address];  Service: Endoscopy;  Laterality: N/A;  Jm/referal d adriana/peg/prep inst mailed/pt  state she dont rember having a stroke/hx of seizures/  12/30-[facility] for pre call-tb-  1/2- needs instructions re-sent via email, [email or web address], need to confirm ride  1/3/25- instructins emailed to      Family History   Problem Relation Name Age of Onset    Hypertension Mother       Social History[1]  Review of Systems   Constitutional:  Positive for fatigue. Negative for activity change, appetite change, chills and fever.   HENT:  Negative for congestion, ear discharge, ear pain, postnasal drip, rhinorrhea, sore throat and trouble swallowing.    Respiratory:  Positive for shortness of breath. Negative for cough.    Cardiovascular:  Positive for chest pain. Negative for palpitations and leg swelling.   Gastrointestinal:  Negative for abdominal pain, blood in stool, constipation, diarrhea, nausea and vomiting.   Genitourinary:  Negative for dysuria, flank pain and hematuria.   Musculoskeletal:  Positive for myalgias. Negative for back pain, neck pain and neck stiffness.   Skin:  Negative for rash and wound.   Neurological:  Positive for weakness (generalized). Negative for dizziness, light-headedness and headaches.       Physical Exam     Initial Vitals [06/26/25 0829]   BP Pulse Resp Temp SpO2   (!) 165/79 65 16 97.6 °F (36.4 °C) 98 %      MAP       --         Physical Exam    Nursing note and vitals reviewed.  Constitutional: Vital signs are normal. She appears well-developed and well-nourished. She is not diaphoretic.  Non-toxic appearance. No distress.   HENT:   Head: Normocephalic.   Right Ear: Hearing, tympanic membrane, external ear and ear canal normal.   Left Ear: Hearing, tympanic membrane, external ear and ear canal normal.   Nose: Nose normal. Mouth/Throat: Uvula is midline, oropharynx is clear and moist and mucous membranes are normal. No trismus in the jaw. No uvula swelling. No oropharyngeal exudate.   Eyes: Conjunctivae and EOM are normal. Pupils are equal, round, and reactive to light.   Neck:  Neck supple.   Normal range of motion.   Full passive range of motion without pain.     Cardiovascular:  Normal rate and regular rhythm.           No lower extremity edema   Pulmonary/Chest: Breath sounds normal.   Abdominal: Abdomen is soft. Bowel sounds are normal. There is no abdominal tenderness.   Musculoskeletal:         General: Normal range of motion.      Cervical back: Full passive range of motion without pain, normal range of motion and neck supple. No rigidity. Normal range of motion.     Lymphadenopathy:     She has no cervical adenopathy.   Neurological: She is alert and oriented to person, place, and time. She has normal strength. No cranial nerve deficit or sensory deficit. Coordination and gait normal. GCS score is 15. GCS eye subscore is 4. GCS verbal subscore is 5. GCS motor subscore is 6.   Skin: Skin is warm and dry. Capillary refill takes less than 2 seconds.   Psychiatric: She has a normal mood and affect.         ED Course   Procedures  Labs Reviewed   COMPREHENSIVE METABOLIC PANEL - Abnormal       Result Value    Sodium 138      Potassium 4.3      Chloride 105      CO2 24      Glucose 89      BUN 6 (*)     Creatinine 0.7      Calcium 9.4      Protein Total 7.3      Albumin 3.9      Bilirubin Total 0.8      ALP 63      AST 17      ALT 8 (*)     Anion Gap 9      eGFR >60     CK - Abnormal     (*)    CBC WITH DIFFERENTIAL - Abnormal    WBC 2.87 (*)     RBC 4.52      HGB 13.0      HCT 41.0      MCV 91      MCH 28.8      MCHC 31.7 (*)     RDW 13.4      Platelet Count 271      MPV 11.0      Nucleated RBC 0      Neut % 41.6      Lymph % 44.6      Mono % 11.1      Eos % 1.4      Basophil % 1.0      Imm Grans % 0.3      Neut # 1.19 (*)     Lymph # 1.28      Mono # 0.32      Eos # 0.04      Baso # 0.03      Imm Grans # 0.01     HEPATITIS C ANTIBODY - Normal    Hep C Ab Interp Non-Reactive     HIV 1 / 2 ANTIBODY - Normal    HIV 1/2 Ag/Ab Non-Reactive     TROPONIN I HIGH SENSITIVITY - Normal     Troponin High Sensitive <3     B-TYPE NATRIURETIC PEPTIDE - Normal    BNP 26     MAGNESIUM - Normal    Magnesium  2.0     PHOSPHORUS - Normal    Phosphorus Level 3.4     TROPONIN I HIGH SENSITIVITY - Normal    Troponin High Sensitive <3     CBC W/ AUTO DIFFERENTIAL    Narrative:     The following orders were created for panel order CBC auto differential.  Procedure                               Abnormality         Status                     ---------                               -----------         ------                     CBC with Differential[[id number]]       Abnormal            Final result                 Please view results for these tests on the individual orders.   HEP C VIRUS HOLD SPECIMEN   B-TYPE NATRIURETIC PEPTIDE   EXTRA TUBES    Narrative:     The following orders were created for panel order EXTRA TUBES.  Procedure                               Abnormality         Status                     ---------                               -----------         ------                     Light Green Top Hold[[id number]]                                                         Please view results for these tests on the individual orders.   LIGHT GREEN TOP HOLD     EKG Readings: (Independently Interpreted)   Initial Reading: No STEMI. Rhythm: Normal Sinus Rhythm.       Imaging Results              X-Ray Chest PA And Lateral (Final result)  Result time 06/26/25 09:22:15      Final result by Ramesh Webb III, MD (06/26/25 09:22:15)                   Impression:      No acute process seen.      Electronically signed by: Ramesh Webb MD  Date:    06/26/2025  Time:    09:22               Narrative:    EXAMINATION:  XR CHEST PA AND LATERAL    CLINICAL HISTORY:  Chest pain, unspecified    FINDINGS:  Chest two views:    Heart size is normal.  Lungs are clear.  The bones demonstrate DJD.                                    X-Rays:   Independently Interpreted Readings:   Other Readings:  No acute cardiopulmonary  process    Medications - No data to display  Medical Decision Making  Urgent evaluation of a 69-year-old female with history of hypertension and hyperlipidemia who presents to the emergency department with vague symptoms.  Patient is afebrile, nontoxic-appearing, and hemodynamically stable.  Patient is chest pain has been intermittent over the last couple of months.  Last episode was last night while sleeping.  Not exertional.  She does have coronary artery disease risk factors.  Will trend troponin.  With patient's other vague complaints, fatigue, generalized weakness, dyspnea on exertion, I will obtain full workup to check H&H, kidney function, electrolytes.  Will obtain chest x-ray.  Patient also complaining of muscle cramps.  Will check CPK.    1:09 PM  Mild leukopenia.  H&H normal.  No electrolyte disruption or kidney insufficiency.  First troponin is undetectable.  Normal BNP.  CPK only mildly elevated - not worrisome for severe rhabdomyolysis.  Chest x-ray reveals no acute cardiopulmonary process.  Second troponin remains undetectable.  Patient is requesting discharge.  She will be discharged with follow up with primary care and Cardiology.  Given strict return precautions.    Amount and/or Complexity of Data Reviewed  Labs: ordered.  Radiology: ordered.      Additional MDM:   Heart Score:    History:          Slightly suspicious.  ECG:             Normal  Age:               >65 years  Risk factors: 1-2 risk factors  Troponin:       Less than or equal to normal limit  Heart Score = 3                                       Clinical Impression:  Final diagnoses:  [R07.9] Chest pain (Primary)  [R53.1] Generalized weakness  [R74.8] Elevated CPK          ED Disposition Condition    Discharge Stable          ED Prescriptions    None       Follow-up Information    None              ZenMalka Rizo PA-C  06/26/25 1316         [1]   Social History  Tobacco Use    Smoking status: Never    Smokeless  tobacco: Never   Substance Use Topics    Alcohol use: Yes     Comment: Occ    Drug use: No        Malka Kuo PA-C  06/26/25 1721

## 2025-06-29 LAB — HOLD SPECIMEN: NORMAL

## 2025-08-08 ENCOUNTER — OFFICE VISIT (OUTPATIENT)
Dept: URGENT CARE | Facility: CLINIC | Age: 70
End: 2025-08-08
Payer: MEDICARE

## 2025-08-08 VITALS
BODY MASS INDEX: 26.7 KG/M2 | TEMPERATURE: 98 F | HEIGHT: 66 IN | SYSTOLIC BLOOD PRESSURE: 120 MMHG | RESPIRATION RATE: 16 BRPM | DIASTOLIC BLOOD PRESSURE: 64 MMHG | HEART RATE: 64 BPM | OXYGEN SATURATION: 100 % | WEIGHT: 166.13 LBS

## 2025-08-08 DIAGNOSIS — J06.9 UPPER RESPIRATORY TRACT INFECTION, UNSPECIFIED TYPE: Primary | ICD-10-CM

## 2025-08-08 LAB
CTP QC/QA: YES
SARS-COV+SARS-COV-2 AG RESP QL IA.RAPID: NEGATIVE

## 2025-08-08 RX ORDER — CETIRIZINE HYDROCHLORIDE 10 MG/1
10 TABLET ORAL NIGHTLY
Qty: 30 TABLET | Refills: 0 | Status: SHIPPED | OUTPATIENT
Start: 2025-08-08

## 2025-08-08 RX ORDER — DEXTROMETHORPHAN HYDROBROMIDE, GUAIFENESIN AND PHENYLEPHRINE HYDROCHLORIDE 15; 400; 10 MG/1; MG/1; MG/1
1 TABLET ORAL EVERY 6 HOURS PRN
Qty: 30 TABLET | Refills: 0 | Status: SHIPPED | OUTPATIENT
Start: 2025-08-08

## 2025-08-08 NOTE — PROGRESS NOTES
"Subjective:      Patient ID: Kimberly Tomas is a 69 y.o. female.    Vitals:  height is 5' 6" (1.676 m) and weight is 75.4 kg (166 lb 1.9 oz). Her oral temperature is 98.3 °F (36.8 °C). Her blood pressure is 120/64 and her pulse is 64. Her respiration is 16 and oxygen saturation is 100%.     Chief Complaint: Cough    69 yr old female presents to the Urgent Care with complaint of coughing, sneezing, runny nose, generalized body aches x 2-3 weeks. Patient report muscle spasms. Patient has not taken anything for symptoms at this time. Patient requesting Covid testing. Patient denies any CP, SOB, abdominal pain or fever.     Cough  This is a new problem. The current episode started 1 to 4 weeks ago. The problem has been gradually worsening. The problem occurs constantly. The cough is Productive of purulent sputum. Associated symptoms include chills, myalgias, nasal congestion, rhinorrhea, a sore throat and sweats. Pertinent negatives include no chest pain, ear congestion, ear pain, fever, headaches, heartburn, hemoptysis, postnasal drip, rash, shortness of breath, weight loss or wheezing. Nothing aggravates the symptoms.     Constitution: Positive for chills and fatigue. Negative for sweating and fever.   HENT:  Positive for sore throat. Negative for ear pain, congestion, postnasal drip, sinus pain, sinus pressure, trouble swallowing and voice change.    Cardiovascular:  Negative for chest pain.   Respiratory:  Positive for cough. Negative for sputum production, bloody sputum, shortness of breath and wheezing.    Gastrointestinal:  Negative for heartburn.   Musculoskeletal:  Positive for muscle ache.   Skin:  Negative for rash.   Allergic/Immunologic: Positive for sneezing.   Neurological:  Negative for headaches.      Objective:     Physical Exam   Constitutional: She is oriented to person, place, and time. She appears well-developed. She is cooperative.  Non-toxic appearance. She does not appear ill. No distress. "   HENT:   Head: Normocephalic and atraumatic.   Ears:   Right Ear: Hearing, tympanic membrane, external ear and ear canal normal.   Left Ear: Hearing, tympanic membrane, external ear and ear canal normal.   Nose: Nose normal. No mucosal edema, rhinorrhea or nasal deformity. No epistaxis. Right sinus exhibits no maxillary sinus tenderness and no frontal sinus tenderness. Left sinus exhibits no maxillary sinus tenderness and no frontal sinus tenderness.   Mouth/Throat: Uvula is midline, oropharynx is clear and moist and mucous membranes are normal. No trismus in the jaw. Normal dentition. No uvula swelling. No oropharyngeal exudate, posterior oropharyngeal edema or posterior oropharyngeal erythema. Tonsils are 2+ on the right. Tonsils are 2+ on the left. No tonsillar exudate.   Eyes: Conjunctivae, EOM and lids are normal. Pupils are equal, round, and reactive to light. No scleral icterus.   Neck: Trachea normal and phonation normal. Neck supple. No edema present. No erythema present. No neck rigidity present.   Cardiovascular: Normal rate and normal heart sounds.   Pulmonary/Chest: Effort normal and breath sounds normal. No accessory muscle usage or stridor. No respiratory distress. She has no decreased breath sounds. She has no wheezes. She has no rhonchi. She has no rales.   Musculoskeletal: Normal range of motion.         General: No deformity or edema. Normal range of motion.   Neurological: She is alert and oriented to person, place, and time. She exhibits normal muscle tone. Coordination and gait normal.   Skin: Skin is warm, dry, intact, not diaphoretic and not pale. Capillary refill takes less than 2 seconds.   Psychiatric: Her speech is normal and behavior is normal. Judgment and thought content normal.   Nursing note and vitals reviewed.      Assessment:     1. Upper respiratory tract infection, unspecified type      Plan:   Patient is non-toxic appearing and in no acute distress.  Flu Negative. Covid  Negative. No focal lung findings, hypoxia, or prolonged period of symptoms to warrant CXR at this time as PNA is highly unlikely. No wheezing or respiratory distress to suggest acute asthma exacerbation. Negative Centor criteria. No sinus TTP or purulent rhinorrhea to suggest acute bacterial rhinosinusitis at this time. No evidence of AOM.      Presentation most consistent with Viral Upper Respiratory Infection. Discharged home with supportive care. I discussed the use of OTC medications for symptom control. I advised patient to maintain adequate hydration and advance diet as tolerated to maintain adequate nutrition. No respiratory distress noted upon discharge.     DDx: Allergic or seasonal rhinitis, Bacterial pharyngitis or tonsillitis, Acute bacterial rhinosinusitis, Influenza, Covid    I discussed with the patient the diagnosis, treatment plan, indications for the emergency department, and for expected follow-up. The patient verbalized an understanding. The patient is asked if there are any questions or concerns. We discuss the case, until all issues are addressed to the patient's satisfaction. Patient understands and is agreeable to the plan.       Upper respiratory tract infection, unspecified type  -     SARS Coronavirus 2 Antigen, POCT Manual Read  -     phenylephrine-DM-guaiFENesin (AQUANAZ) 10- mg Tab; Take 1 tablet by mouth every 6 (six) hours as needed (Cough and Congestion).  Dispense: 30 tablet; Refill: 0  -     cetirizine (ZYRTEC) 10 MG tablet; Take 1 tablet (10 mg total) by mouth every evening.  Dispense: 30 tablet; Refill: 0

## 2025-08-08 NOTE — PATIENT INSTRUCTIONS
